# Patient Record
Sex: MALE | Race: WHITE
[De-identification: names, ages, dates, MRNs, and addresses within clinical notes are randomized per-mention and may not be internally consistent; named-entity substitution may affect disease eponyms.]

---

## 2020-10-08 ENCOUNTER — HOSPITAL ENCOUNTER (EMERGENCY)
Dept: HOSPITAL 54 - ER | Age: 30
Discharge: HOME | End: 2020-10-08
Payer: COMMERCIAL

## 2020-10-08 VITALS — SYSTOLIC BLOOD PRESSURE: 134 MMHG | DIASTOLIC BLOOD PRESSURE: 85 MMHG

## 2020-10-08 VITALS — WEIGHT: 199 LBS | BODY MASS INDEX: 29.47 KG/M2 | HEIGHT: 69 IN
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
## 2020-10-08 DIAGNOSIS — Z20.828: ICD-10-CM

## 2020-10-08 DIAGNOSIS — S52.572A: Primary | ICD-10-CM
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
## 2020-10-08 DIAGNOSIS — M25.462: ICD-10-CM

## 2020-10-08 DIAGNOSIS — S52.611A: ICD-10-CM

## 2020-10-08 DIAGNOSIS — S62.111A: ICD-10-CM

## 2020-10-08 DIAGNOSIS — S42.401A: ICD-10-CM

## 2020-10-08 PROCEDURE — 99152 MOD SED SAME PHYS/QHP 5/>YRS: CPT

## 2020-10-08 PROCEDURE — 87426 SARSCOV CORONAVIRUS AG IA: CPT

## 2020-10-08 PROCEDURE — 73100 X-RAY EXAM OF WRIST: CPT

## 2020-10-08 PROCEDURE — 73070 X-RAY EXAM OF ELBOW: CPT

## 2020-10-08 PROCEDURE — 73564 X-RAY EXAM KNEE 4 OR MORE: CPT

## 2020-10-08 PROCEDURE — G0500 MOD SEDAT ENDO SERVICE >5YRS: HCPCS

## 2020-10-08 PROCEDURE — 25605 CLTX DST RDL FX/EPHYS SEP W/: CPT

## 2020-10-08 PROCEDURE — 73110 X-RAY EXAM OF WRIST: CPT

## 2020-10-08 PROCEDURE — 96374 THER/PROPH/DIAG INJ IV PUSH: CPT

## 2020-10-08 PROCEDURE — 99285 EMERGENCY DEPT VISIT HI MDM: CPT

## 2020-10-08 PROCEDURE — 24600 TX CLSD ELBOW DISLC W/O ANES: CPT
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
## 2020-10-08 NOTE — NUR
PATIENT AWAKE, ALERT AND ORIENTED X4, BREATHING EVEN AND UNLABORED, NO SOB 
NOTED. NEEDS ATTENDED. IV removed. Catheter intact and site benign. Pressure 
and 4x4 applied to site. No bleeding noted. Patient discharged to home in 
stable condition. Written and verbal after care instructions given. Patient 
verbalizes understanding of instruction.

## 2020-10-12 ENCOUNTER — HOSPITAL ENCOUNTER (INPATIENT)
Dept: HOSPITAL 54 - ER | Age: 30
LOS: 14 days | Discharge: HOME HEALTH | DRG: 501 | End: 2020-10-26
Attending: NURSE PRACTITIONER | Admitting: NURSE PRACTITIONER
Payer: COMMERCIAL

## 2020-10-12 VITALS — DIASTOLIC BLOOD PRESSURE: 82 MMHG | SYSTOLIC BLOOD PRESSURE: 141 MMHG

## 2020-10-12 VITALS — WEIGHT: 183 LBS | BODY MASS INDEX: 27.11 KG/M2 | HEIGHT: 69 IN

## 2020-10-12 VITALS — DIASTOLIC BLOOD PRESSURE: 80 MMHG | SYSTOLIC BLOOD PRESSURE: 150 MMHG

## 2020-10-12 DIAGNOSIS — S62.102A: ICD-10-CM
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
## 2020-10-12 DIAGNOSIS — S80.02XA: ICD-10-CM
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
## 2020-10-12 DIAGNOSIS — S62.111A: ICD-10-CM

## 2020-10-12 DIAGNOSIS — S52.572A: ICD-10-CM

## 2020-10-12 DIAGNOSIS — S52.121A: Primary | ICD-10-CM

## 2020-10-12 DIAGNOSIS — S52.041A: ICD-10-CM
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
## 2020-10-12 DIAGNOSIS — T79.A11A: ICD-10-CM

## 2020-10-12 LAB
BASOPHILS # BLD AUTO: 0 /CMM (ref 0–0.2)
BASOPHILS NFR BLD AUTO: 0.4 % (ref 0–2)
BUN SERPL-MCNC: 12 MG/DL (ref 7–18)
CALCIUM SERPL-MCNC: 8.7 MG/DL (ref 8.5–10.1)
CHLORIDE SERPL-SCNC: 99 MMOL/L (ref 98–107)
CO2 SERPL-SCNC: 30 MMOL/L (ref 21–32)
CREAT SERPL-MCNC: 0.9 MG/DL (ref 0.6–1.3)
EOSINOPHIL NFR BLD AUTO: 2.1 % (ref 0–6)
GLUCOSE SERPL-MCNC: 120 MG/DL (ref 74–106)
HCT VFR BLD AUTO: 39 % (ref 39–51)
HGB BLD-MCNC: 13.2 G/DL (ref 13.5–17.5)
LYMPHOCYTES NFR BLD AUTO: 1.3 /CMM (ref 0.8–4.8)
LYMPHOCYTES NFR BLD AUTO: 18.9 % (ref 20–44)
MCHC RBC AUTO-ENTMCNC: 34 G/DL (ref 31–36)
MCV RBC AUTO: 86 FL (ref 80–96)
MONOCYTES NFR BLD AUTO: 0.6 /CMM (ref 0.1–1.3)
MONOCYTES NFR BLD AUTO: 9.2 % (ref 2–12)
NEUTROPHILS # BLD AUTO: 4.7 /CMM (ref 1.8–8.9)
NEUTROPHILS NFR BLD AUTO: 69.4 % (ref 43–81)
PLATELET # BLD AUTO: 315 /CMM (ref 150–450)
POTASSIUM SERPL-SCNC: 4.2 MMOL/L (ref 3.5–5.1)
RBC # BLD AUTO: 4.49 MIL/UL (ref 4.5–6)
SODIUM SERPL-SCNC: 136 MMOL/L (ref 136–145)
WBC NRBC COR # BLD AUTO: 6.8 K/UL (ref 4.3–11)

## 2020-10-12 PROCEDURE — A6407 PACKING STRIPS, NON-IMPREG: HCPCS

## 2020-10-12 PROCEDURE — A6253 ABSORPT DRG > 48 SQ IN W/O B: HCPCS

## 2020-10-12 PROCEDURE — A6402 STERILE GAUZE <= 16 SQ IN: HCPCS

## 2020-10-12 PROCEDURE — G0378 HOSPITAL OBSERVATION PER HR: HCPCS

## 2020-10-12 PROCEDURE — A4217 STERILE WATER/SALINE, 500 ML: HCPCS

## 2020-10-12 PROCEDURE — C1713 ANCHOR/SCREW BN/BN,TIS/BN: HCPCS

## 2020-10-12 PROCEDURE — A4565 SLINGS: HCPCS

## 2020-10-12 PROCEDURE — C9803 HOPD COVID-19 SPEC COLLECT: HCPCS

## 2020-10-12 PROCEDURE — C9113 INJ PANTOPRAZOLE SODIUM, VIA: HCPCS

## 2020-10-12 RX ADMIN — HYDROCODONE BITARTRATE AND ACETAMINOPHEN PRN TAB: 10; 325 TABLET ORAL at 23:06

## 2020-10-12 RX ADMIN — HYDROCODONE BITARTRATE AND ACETAMINOPHEN PRN TAB: 10; 325 TABLET ORAL at 18:12

## 2020-10-12 RX ADMIN — SODIUM CHLORIDE PRN MLS/HR: 9 INJECTION, SOLUTION INTRAVENOUS at 21:28

## 2020-10-12 NOTE — NUR
PT aox4. pt sent by Dr. Tiwari (ortho) for pre op, scheduled surgery tomorrow. 
no c/o pain at this time. awaiting for MD leiva

## 2020-10-12 NOTE — NUR
RN NOTES



RECEIVED PATIENT FROM E.R, ABLE TO MAKE NEEDS KNOWN. A/O X4. ROOM AIR WITH NO SIGNS OF 
DISTRESS NOTED AT THIS TIME. PATIENT ORIENTED TO ROOM, UNIT AND STAFF. IV ACCESS ON LEFT 
AC#20, SL. SAFETY MEASURES INITIATED, BED PLACED IN LOWEST LOCKED POSITION WITH SIDE RAILS 
UP X2. CALL LIGHT PLACE WITHIN EASY REACH. WILL ENDORSE TO NIGHT SHIFT NURSE FOR ADMISSION.

## 2020-10-13 VITALS — DIASTOLIC BLOOD PRESSURE: 88 MMHG | SYSTOLIC BLOOD PRESSURE: 148 MMHG

## 2020-10-13 VITALS — DIASTOLIC BLOOD PRESSURE: 75 MMHG | SYSTOLIC BLOOD PRESSURE: 149 MMHG

## 2020-10-13 VITALS — SYSTOLIC BLOOD PRESSURE: 151 MMHG | DIASTOLIC BLOOD PRESSURE: 81 MMHG

## 2020-10-13 VITALS — DIASTOLIC BLOOD PRESSURE: 79 MMHG | SYSTOLIC BLOOD PRESSURE: 142 MMHG

## 2020-10-13 VITALS — SYSTOLIC BLOOD PRESSURE: 150 MMHG | DIASTOLIC BLOOD PRESSURE: 79 MMHG

## 2020-10-13 VITALS — SYSTOLIC BLOOD PRESSURE: 153 MMHG | DIASTOLIC BLOOD PRESSURE: 81 MMHG

## 2020-10-13 LAB
BASOPHILS # BLD AUTO: 0 /CMM (ref 0–0.2)
BASOPHILS NFR BLD AUTO: 0.5 % (ref 0–2)
BUN SERPL-MCNC: 14 MG/DL (ref 7–18)
CALCIUM SERPL-MCNC: 7.9 MG/DL (ref 8.5–10.1)
CHLORIDE SERPL-SCNC: 106 MMOL/L (ref 98–107)
CO2 SERPL-SCNC: 26 MMOL/L (ref 21–32)
CREAT SERPL-MCNC: 0.8 MG/DL (ref 0.6–1.3)
EOSINOPHIL NFR BLD AUTO: 2.9 % (ref 0–6)
GLUCOSE SERPL-MCNC: 86 MG/DL (ref 74–106)
HCT VFR BLD AUTO: 33 % (ref 39–51)
HGB BLD-MCNC: 11.4 G/DL (ref 13.5–17.5)
LYMPHOCYTES NFR BLD AUTO: 1.7 /CMM (ref 0.8–4.8)
LYMPHOCYTES NFR BLD AUTO: 28.2 % (ref 20–44)
MAGNESIUM SERPL-MCNC: 2 MG/DL (ref 1.8–2.4)
MCHC RBC AUTO-ENTMCNC: 35 G/DL (ref 31–36)
MCV RBC AUTO: 86 FL (ref 80–96)
MONOCYTES NFR BLD AUTO: 0.6 /CMM (ref 0.1–1.3)
MONOCYTES NFR BLD AUTO: 9.8 % (ref 2–12)
NEUTROPHILS # BLD AUTO: 3.6 /CMM (ref 1.8–8.9)
NEUTROPHILS NFR BLD AUTO: 58.6 % (ref 43–81)
PHOSPHATE SERPL-MCNC: 3.7 MG/DL (ref 2.5–4.9)
PLATELET # BLD AUTO: 294 /CMM (ref 150–450)
POTASSIUM SERPL-SCNC: 3.6 MMOL/L (ref 3.5–5.1)
RBC # BLD AUTO: 3.82 MIL/UL (ref 4.5–6)
SODIUM SERPL-SCNC: 140 MMOL/L (ref 136–145)
WBC NRBC COR # BLD AUTO: 6.1 K/UL (ref 4.3–11)

## 2020-10-13 PROCEDURE — 0PSH04Z REPOSITION RIGHT RADIUS WITH INTERNAL FIXATION DEVICE, OPEN APPROACH: ICD-10-PCS | Performed by: SPECIALIST

## 2020-10-13 PROCEDURE — 0PSK04Z REPOSITION RIGHT ULNA WITH INTERNAL FIXATION DEVICE, OPEN APPROACH: ICD-10-PCS | Performed by: SPECIALIST

## 2020-10-13 PROCEDURE — 0KN90ZZ RELEASE RIGHT LOWER ARM AND WRIST MUSCLE, OPEN APPROACH: ICD-10-PCS | Performed by: SPECIALIST

## 2020-10-13 PROCEDURE — 01N40ZZ RELEASE ULNAR NERVE, OPEN APPROACH: ICD-10-PCS | Performed by: SPECIALIST

## 2020-10-13 PROCEDURE — 0PSJ04Z REPOSITION LEFT RADIUS WITH INTERNAL FIXATION DEVICE, OPEN APPROACH: ICD-10-PCS | Performed by: SPECIALIST

## 2020-10-13 RX ADMIN — SODIUM CHLORIDE PRN MLS/HR: 9 INJECTION, SOLUTION INTRAVENOUS at 10:08

## 2020-10-13 RX ADMIN — OXYCODONE HYDROCHLORIDE AND ACETAMINOPHEN PRN UDTAB: 5; 325 TABLET ORAL at 21:46

## 2020-10-13 RX ADMIN — DEXTROSE MONOHYDRATE SCH MLS/HR: 50 INJECTION, SOLUTION INTRAVENOUS at 21:18

## 2020-10-13 RX ADMIN — SODIUM CHLORIDE SCH MG: 9 INJECTION, SOLUTION INTRAVENOUS at 10:12

## 2020-10-13 RX ADMIN — HYDROMORPHONE HYDROCHLORIDE PRN MG: 1 INJECTION, SOLUTION INTRAMUSCULAR; INTRAVENOUS; SUBCUTANEOUS at 10:13

## 2020-10-13 RX ADMIN — DEXTROSE MONOHYDRATE SCH MLS/HR: 50 INJECTION, SOLUTION INTRAVENOUS at 22:23

## 2020-10-13 RX ADMIN — HYDROMORPHONE HYDROCHLORIDE PRN MG: 1 INJECTION, SOLUTION INTRAMUSCULAR; INTRAVENOUS; SUBCUTANEOUS at 19:23

## 2020-10-13 NOTE — NUR
MS/TELE/RN

PATIENT IS STILL SLEEPING AT THIS TIME, APPEAR COMFORTABLE, NO SIGNS OF DISTRESS NOTED, CALL 
LIGHT IN REACH, ALL NEEDS ATTENDED AT THIS TIME, WILL CONTINUE TO MONITOR.

## 2020-10-13 NOTE — NUR
RN  NOTE



THE PATIENT IS RECEIVED IN BED. THE PATIENT IS ALERT AND ORIENTED X4. DENIES PAIN. IN ROOM 
AIR AND DENIES SOB. RESPIRATION REGULAR AND UNLABORED. LAC G 18 PATENT AND NS INFUSING AT 
75ML/HR AND NO S/S INFILTRATION NOTED. BOTH ARM ON SLINGS. BOTH HANDS ARE SWOLLEN BUT THE 
PATIENT DENIES NUMBING OR TINGLING SENSATION. BED LOW AND LOCKED. SIDE RAILS UP X2. CALL 
LIGHT WITHIN REACH. WILL CONTINUE TO MONITOR.

## 2020-10-13 NOTE — NUR
RN  NOTE



THE PATIENT IS TAKEN TO OR BY THE OR TEAM. THE PATIENT LEFT THE UNIT IN STABLE CONDITION.

## 2020-10-13 NOTE — NUR
RN  NOTE



THE PATIENT IS BACK FROM SURGERY. ALERT AND ORIENTED X4. DENIES SOB. RESPIRATION REGULAR AND 
UNLABORED. LEFT FOOT G20 IV PATENT AND NS INFUSING AT 75ML/HR AND NO S/S INFILTRATION NOTED. 
WOUND VAC ON RIGHT ARM FUNCTIONING WELL WITH 100 ML OF SEROSANGUINEOUS OUTPUT. BED LOW AND 
LOCKED. SIDE RAILS UP X3. CALL LIGHT WITHIN REACH. WILL ENDORSE TO NIGHT SHIFT

## 2020-10-13 NOTE — NUR
RN  NOTE



RECEIVED A CALL [facility] STATING THAT NOW THE PATIENT A WOUND VAC AND THE WOUND VAC NOT 
TO BE REMOVED OR CHANGED BY NURSES.

## 2020-10-13 NOTE — NUR
MS/TELE/RN

RECEIVED PATIENT ON BED S/P ORIF RT. ELBOW FRACTURE AND ORIF LEFT WRIST FRACTURE DONE TODAY. 
PATIENT IS AWAKE, SLEEPY, APPEAR COMFORTABLE, WITH TOLERABLE PAIN LEVEL AT THIS TIME, JUST 
RECEIVED PAIN MEDICATION GIVEN BY DAY SHIFT RN, NO DISTRESS NOTED, BILATERAL UPPER 
EXTREMITIES SLING, WOUND VAC TO RT. UPPER EXTREMITY WITH SANGUINEOUS OUTPUT. WILL MONITOR 
PER POST OF PROTOCOL. Pt cancelled appt scheduled for tomorrow.  Pt requesting an appt sooner than first available preferably next month (1st or 2nd week) Please call [phone]

## 2020-10-13 NOTE — NUR
MS/TELE/RN

PATIENT IS SLEEPING AT THIS TIME, APPEAR COMFORTABLE, NO SIGNS OF DISTRESS NOTED, CALL LIGHT 
IN REACH, WILL CONTINUE TO MONITOR.

## 2020-10-14 VITALS — DIASTOLIC BLOOD PRESSURE: 88 MMHG | SYSTOLIC BLOOD PRESSURE: 146 MMHG

## 2020-10-14 VITALS — SYSTOLIC BLOOD PRESSURE: 146 MMHG | DIASTOLIC BLOOD PRESSURE: 77 MMHG

## 2020-10-14 VITALS — SYSTOLIC BLOOD PRESSURE: 155 MMHG | DIASTOLIC BLOOD PRESSURE: 89 MMHG

## 2020-10-14 VITALS — SYSTOLIC BLOOD PRESSURE: 161 MMHG | DIASTOLIC BLOOD PRESSURE: 74 MMHG

## 2020-10-14 VITALS — DIASTOLIC BLOOD PRESSURE: 86 MMHG | SYSTOLIC BLOOD PRESSURE: 146 MMHG

## 2020-10-14 LAB
ALBUMIN SERPL BCP-MCNC: 2.9 G/DL (ref 3.4–5)
ALP SERPL-CCNC: 47 U/L (ref 46–116)
ALT SERPL W P-5'-P-CCNC: 29 U/L (ref 12–78)
AST SERPL W P-5'-P-CCNC: 37 U/L (ref 15–37)
BASOPHILS # BLD AUTO: 0 /CMM (ref 0–0.2)
BASOPHILS NFR BLD AUTO: 0.2 % (ref 0–2)
BILIRUB SERPL-MCNC: 1 MG/DL (ref 0.2–1)
BUN SERPL-MCNC: 13 MG/DL (ref 7–18)
CALCIUM SERPL-MCNC: 8.2 MG/DL (ref 8.5–10.1)
CHLORIDE SERPL-SCNC: 101 MMOL/L (ref 98–107)
CO2 SERPL-SCNC: 27 MMOL/L (ref 21–32)
CREAT SERPL-MCNC: 0.9 MG/DL (ref 0.6–1.3)
EOSINOPHIL NFR BLD AUTO: 0.5 % (ref 0–6)
GLUCOSE SERPL-MCNC: 110 MG/DL (ref 74–106)
HCT VFR BLD AUTO: 30 % (ref 39–51)
HGB BLD-MCNC: 10.5 G/DL (ref 13.5–17.5)
LYMPHOCYTES NFR BLD AUTO: 1.3 /CMM (ref 0.8–4.8)
LYMPHOCYTES NFR BLD AUTO: 14.3 % (ref 20–44)
MCHC RBC AUTO-ENTMCNC: 35 G/DL (ref 31–36)
MCV RBC AUTO: 86 FL (ref 80–96)
MONOCYTES NFR BLD AUTO: 1.4 /CMM (ref 0.1–1.3)
MONOCYTES NFR BLD AUTO: 14.6 % (ref 2–12)
NEUTROPHILS # BLD AUTO: 6.5 /CMM (ref 1.8–8.9)
NEUTROPHILS NFR BLD AUTO: 70.4 % (ref 43–81)
PLATELET # BLD AUTO: 331 /CMM (ref 150–450)
POTASSIUM SERPL-SCNC: 3.9 MMOL/L (ref 3.5–5.1)
PROT SERPL-MCNC: 6.3 G/DL (ref 6.4–8.2)
RBC # BLD AUTO: 3.51 MIL/UL (ref 4.5–6)
SODIUM SERPL-SCNC: 137 MMOL/L (ref 136–145)
WBC NRBC COR # BLD AUTO: 9.3 K/UL (ref 4.3–11)

## 2020-10-14 RX ADMIN — Medication PRN MG: at 02:14

## 2020-10-14 RX ADMIN — DEXTROSE MONOHYDRATE SCH MLS/HR: 50 INJECTION, SOLUTION INTRAVENOUS at 06:38

## 2020-10-14 RX ADMIN — Medication PRN MG: at 12:31

## 2020-10-14 RX ADMIN — SODIUM CHLORIDE PRN MLS/HR: 9 INJECTION, SOLUTION INTRAVENOUS at 20:19

## 2020-10-14 RX ADMIN — SODIUM CHLORIDE SCH MG: 9 INJECTION, SOLUTION INTRAVENOUS at 08:57

## 2020-10-14 RX ADMIN — OXYCODONE HYDROCHLORIDE AND ACETAMINOPHEN PRN UDTAB: 5; 325 TABLET ORAL at 12:22

## 2020-10-14 RX ADMIN — DEXTROSE MONOHYDRATE SCH MLS/HR: 50 INJECTION, SOLUTION INTRAVENOUS at 06:02

## 2020-10-14 RX ADMIN — HYDROCODONE BITARTRATE AND ACETAMINOPHEN PRN TAB: 10; 325 TABLET ORAL at 10:13

## 2020-10-14 RX ADMIN — ENOXAPARIN SODIUM SCH MG: 40 INJECTION SUBCUTANEOUS at 08:59

## 2020-10-14 RX ADMIN — OXYCODONE HYDROCHLORIDE AND ACETAMINOPHEN PRN UDTAB: 5; 325 TABLET ORAL at 16:32

## 2020-10-14 RX ADMIN — OXYCODONE HYDROCHLORIDE AND ACETAMINOPHEN PRN UDTAB: 5; 325 TABLET ORAL at 06:08

## 2020-10-14 RX ADMIN — SODIUM CHLORIDE PRN MLS/HR: 9 INJECTION, SOLUTION INTRAVENOUS at 06:10

## 2020-10-14 RX ADMIN — OXYCODONE HYDROCHLORIDE AND ACETAMINOPHEN PRN UDTAB: 5; 325 TABLET ORAL at 20:32

## 2020-10-14 NOTE — NUR
MS/RN NOTE



Dr. Tiwari at bedside, states patient will go back to OR later this week to remove wound vac 
and close incision, may be discharged on Friday.

## 2020-10-14 NOTE — NUR
RN OPENING NOTES 



PATIENT RECEIVED RESTING IN BED A/O X 4. STABLE ON RA WITH BREATHING EVEN AND UNLABORED, NO 
SOB NOTED. NO SIGNS OF ACUTE DISTRESS. NO COMPLAINTS OF PAIN OR DISCOMFORT AT THE MOMENT. IV 
LOCATED ON L FOOT #20 RUNNING NS @ 75 ML/HR. WOUND VAC NOTED AND IN PLACE ON R ELBOW. BOTH 
UPPER EXTREMITIES NWB. SAFETY PRECAUTIONS IN PLACE WITH BED IN LOWEST POSITION, CALL LIGHT 
WITHIN REACH, BREAKS ON, SIDE RAILS UP. WILL CONTINUE TO MONITOR THROUGHOUT THE NIGHT.

## 2020-10-14 NOTE — NUR
MS/TELE/RN

MORNING CARE DONE, PATIENT WAS ABLE TO SIT ON CHAIR AT BEDSIDE WHILE DOING  THE BED. BROUGHT 
BACK TO BED, MADE COMFORTABLE IN BED, TOLERATED WELL, BED ALARM ON. PATIENT IS AWAKE, ALERT, 
NO CHANGE IN CONDITION, ALL NEEDS ATTENDED AT THIS TIME, WILL CONTINUE TO MONITOR.

## 2020-10-14 NOTE — NUR
MS/RN NOTE



Patient reports 8/10 aching pain on BLE, requested percocet 5-325: 2 tablets q4h as needed 
for pain (8-10/10) initially. But changed his mind and would rather take morphine 4 mg q4h 
IV as needed for breakthrough pain. Returned percocet 5-325 at the return bin, witnessed by 
Immaculate RN.

## 2020-10-14 NOTE — NUR
MS/RN OPENING NOTES



Received patient resting in bed, A&O x 4. Denies any pain and discomfort at this time. 
Breathing even and non-labored on RA. No respiratory or cardiac distress noted. IV access 
noted on L foot #20, patent and intact, and flushing well. BUE slings in place, both hands 
slightly still swollen, no tingling or numbness reported. Sensation from all peripheral 
extremities intact. Bed locked to its lowest position, bed alarm on, side rails x 2 up, call 
light in hand. Will continue current medical management. 

-------------------------------------------------------------------------------

Addendum: 10/14/20 at 0738 by GAYATRI KAN RN

-------------------------------------------------------------------------------

Wound vac noted with sanguinous drainage. Will continue to monitor.

-------------------------------------------------------------------------------

Addendum: 10/14/20 at 1924 by GAYATRI KAN RN

-------------------------------------------------------------------------------

serosanguinous drainage, not sanguinous

## 2020-10-14 NOTE — NUR
MS/RN CLOSING NOTES



Patient sleeping in bed, A&O x 4. All needs met and attended to. VSS, afebrile, no SOB 
noted. Denies any pain and discomfort at this time, given percocet at 1632. Breathing even 
and non-labored on RA. No respiratory or cardiac distress noted. IV access noted on L foot 
#20, patent and intact, and flushing well. BUE slings remain in place, both hands slightly 
still swollen, no tingling or numbness reported. Right elbow remains at 90 degrees. Wound 
vac in place, 200 mL serosanguinous output today. Sensation from all peripheral extremities 
intact. Fall precautions maintained. Will endorse to night shift nurse.

## 2020-10-15 VITALS — DIASTOLIC BLOOD PRESSURE: 91 MMHG | SYSTOLIC BLOOD PRESSURE: 154 MMHG

## 2020-10-15 VITALS — SYSTOLIC BLOOD PRESSURE: 138 MMHG | DIASTOLIC BLOOD PRESSURE: 83 MMHG

## 2020-10-15 VITALS — DIASTOLIC BLOOD PRESSURE: 96 MMHG | SYSTOLIC BLOOD PRESSURE: 181 MMHG

## 2020-10-15 VITALS — DIASTOLIC BLOOD PRESSURE: 78 MMHG | SYSTOLIC BLOOD PRESSURE: 136 MMHG

## 2020-10-15 VITALS — DIASTOLIC BLOOD PRESSURE: 81 MMHG | SYSTOLIC BLOOD PRESSURE: 145 MMHG

## 2020-10-15 RX ADMIN — Medication PRN MG: at 21:21

## 2020-10-15 RX ADMIN — OXYCODONE HYDROCHLORIDE AND ACETAMINOPHEN PRN UDTAB: 5; 325 TABLET ORAL at 12:16

## 2020-10-15 RX ADMIN — OXYCODONE HYDROCHLORIDE AND ACETAMINOPHEN PRN UDTAB: 5; 325 TABLET ORAL at 18:12

## 2020-10-15 RX ADMIN — OXYCODONE HYDROCHLORIDE AND ACETAMINOPHEN PRN UDTAB: 5; 325 TABLET ORAL at 06:24

## 2020-10-15 RX ADMIN — OXYCODONE HYDROCHLORIDE AND ACETAMINOPHEN PRN UDTAB: 5; 325 TABLET ORAL at 23:54

## 2020-10-15 RX ADMIN — ENOXAPARIN SODIUM SCH MG: 40 INJECTION SUBCUTANEOUS at 08:42

## 2020-10-15 RX ADMIN — OXYCODONE HYDROCHLORIDE AND ACETAMINOPHEN PRN UDTAB: 5; 325 TABLET ORAL at 00:41

## 2020-10-15 RX ADMIN — SODIUM CHLORIDE PRN MLS/HR: 9 INJECTION, SOLUTION INTRAVENOUS at 09:39

## 2020-10-15 RX ADMIN — SODIUM CHLORIDE SCH MG: 9 INJECTION, SOLUTION INTRAVENOUS at 08:41

## 2020-10-15 NOTE — NUR
MS/RN CLOSING NOTES

PATIENT IS ON BED AWAKE, ALERT AND ORIENTED X4. PATIENT IN  NO APPARENT RESPIRATORY DISTRESS 
NOTED. PATIENT DENIES PAIN AT THIS TIME. IV ACCESS AT LEFT AC # 20 G WITH IV FLUID OF NS 1L 
AT 75 ML/HR ON AND INFUSING WELL. SEEN AND EXAMINED BY MD WITH ORDERS MADE AND CARRIED OUT. 
SAFETY PRECAUTIONS WAS IN PLACED. BED IN LOWEST POSITION AND LOCKED. SIDERAILS UP X2. CALL 
LIGHT WITHIN REACH. WILL ENDORSED TO NIGHT SHIFT FOR DIXON. WILL ENDORSED TO NIGHT SHIFT FOR 
DIXON.

## 2020-10-15 NOTE — NUR
RN CLOSING NOTES 



PATIENT RESTING IN BED A/O X 4. STABLE ON RA WITH BREATHING EVEN AND UNLABORED, NO SOB 
NOTED. NO SIGNS OF ACUTE DISTRESS. NO COMPLAINTS OF PAIN OR DISCOMFORT AT THE MOMENT. IV 
LOCATED ON L FOOT #20 RUNNING NS @ 75 ML/HR. WOUND VAC NOTED AND IN PLACE ON R ARM TOTAL OF 
350 SERASANGEOUS OUTPUT. BOTH UPPER EXTREMITIES NWB. SAFETY PRECAUTIONS IN PLACE WITH BED IN 
LOWEST POSITION, CALL LIGHT WITHIN REACH, BREAKS ON, SIDE RAILS UP. PAIN MANAGEMENT 
THROUGHOUT THE NIGHT. ALL NEEDS ATTENDED TO. WILL ENDORSE TO ONCOMING SHIFT ABOUT DIXON.

## 2020-10-15 NOTE — NUR
RN NOTES

RECEIVED PT. AWAKE ON BED, AOX4, BILATERAL ARM WITH DRESSING AND SLING, WOUND VAC IN PLACE, 
NOT IN DISTRESS, DENIES PAIN AT THIS TIME,NO SOB, SIDERAILSUPX2, CONTINUE TO MONITOR

## 2020-10-15 NOTE — NUR
MS/RN NOTES

PATIENT COMPLAINED OF PAIN RATED 8/10 PERCOCET 5/325 MG 2 TABS. WILL CONTINUE TO MONITOR.

## 2020-10-15 NOTE — NUR
MS/RN OPENING NOTES

RECEIVED PATIENT IN BED SLEEPING EASILY AROUSABLE BY NAME AND LIGHT TOUCH. PATIENT IN NO 
APPARENT RESPIRATORY DISTRESS NOTED. DENIES PAIN AT THIS TIME. WILL CONTINUE TO MONITOR.

## 2020-10-16 VITALS — SYSTOLIC BLOOD PRESSURE: 152 MMHG | DIASTOLIC BLOOD PRESSURE: 80 MMHG

## 2020-10-16 VITALS — DIASTOLIC BLOOD PRESSURE: 80 MMHG | SYSTOLIC BLOOD PRESSURE: 152 MMHG

## 2020-10-16 PROCEDURE — 2W0CX6Z CHANGE PRESSURE DRESSING ON RIGHT LOWER ARM: ICD-10-PCS | Performed by: SPECIALIST

## 2020-10-16 PROCEDURE — 3E1U38Z IRRIGATION OF JOINTS USING IRRIGATING SUBSTANCE, PERCUTANEOUS APPROACH: ICD-10-PCS | Performed by: SPECIALIST

## 2020-10-16 RX ADMIN — SODIUM CHLORIDE PRN MLS/HR: 9 INJECTION, SOLUTION INTRAVENOUS at 21:15

## 2020-10-16 RX ADMIN — OXYCODONE HYDROCHLORIDE AND ACETAMINOPHEN PRN UDTAB: 5; 325 TABLET ORAL at 22:03

## 2020-10-16 RX ADMIN — SODIUM CHLORIDE PRN MLS/HR: 9 INJECTION, SOLUTION INTRAVENOUS at 02:40

## 2020-10-16 RX ADMIN — Medication PRN MG: at 06:00

## 2020-10-16 RX ADMIN — PANTOPRAZOLE SODIUM SCH MG: 40 TABLET, DELAYED RELEASE ORAL at 09:00

## 2020-10-16 RX ADMIN — DEXTROSE MONOHYDRATE SCH MLS/HR: 50 INJECTION, SOLUTION INTRAVENOUS at 21:06

## 2020-10-16 RX ADMIN — ENOXAPARIN SODIUM SCH MG: 40 INJECTION SUBCUTANEOUS at 09:00

## 2020-10-16 NOTE — NUR
WOUND CARE: PT HAS [employer] VAC TO RT ARM WITH NEARLY FULL CANISTER 475cc SEROSANGUINOUS 
DRAINAGE. CANISTER CHANGED. RN NOTIFIED AND SURGEON TO BE NOTIFIED BY WOUND RN. WILL SEE 
PRN.

## 2020-10-16 NOTE — NUR
WOUND CARE RN   WOUND CARE RN SPOKE TO JOVANNY IN SURGERY, SHE WILL LET DR FOFANA KNOW THAT 
PATIENT'S VAC CANNISTER WAS CHANGED THIS AM, THE SAME CANNISTER THAT WAS PLACED IN OR 
10/14/20 AND THE DRNG AMOUNT WAS 475ML'S AS OF THIS AM.

## 2020-10-16 NOTE — NUR
MS/RN CLOSING NOTES

PATIENT IS ON BED. ALERT AND ORIENTED X4. PATIENT DENIES PAIN AT THIS TIME. IV ACCESS AT 
LEFT FOOT # 20 WITH IV FLUID OF NS 1L AT 75 ML/HR ON AND INFUSING WELL. SEEN AND EXAMINED BY 
MD WITH ORDERS MADE AND CARRIED OUT. SAFETY PRECAUTION WAS IN PLACED. BED IN LOWEST POSITION 
AND LOCKED. SIDERAILS UP X2. CALL LIGHT WITHIN REACH. PATIENT IS POSSIBLE DISCHARGED TODAY. 
WILL ENDORSED TO NIGHT SHIFT FOR DIXON.

## 2020-10-16 NOTE — NUR
MS RN



RECEIVE PT IN BED, AWAKE, STABLE A/O X 4 NOT IN DISTRESS, WOUND VAC ONGOING, CONT NO WT 
BEARING RUE/LUE, ELEVATE SLING AT ALL TIMES. DENIES PAIN AT THIS TIME. EDUCATED PT. SAFETY 
MEASURES AT ALL TIMES. WILL CONT TO MONITOR

## 2020-10-16 NOTE — NUR
MS/RN OPENING NOTES

PATIENT IS ON BED AWAKE ALERT AND ORIENTED X4. PATIENT DENIES PAIN AT THIS TIME. PATIENT IN 
NO APPARENT RESPIRATORY DISTRESS NOTED. WILL CONTINUE TO MONITOR.

## 2020-10-16 NOTE — NUR
RN NOTES

SLEEPING BUT AROUSABLE, NOT IN DISTRESS, DENIES PAIN AT THIS TIME, NO SOB,CALL LIGHT WITHIN 
REACH, SIDERIALSUPX2, PT.NEEDS ATTENDED

## 2020-10-16 NOTE — NUR
MS/RN NOTES

PATIENT IS OUT IN THE UNIT.  BY OR NURSE. PATIENT IS IN STABLE CONDITION. PATIENT IN 
NO APPARENT RESPIRATORY DISTRESS NOTED. DENIES PAIN.

## 2020-10-16 NOTE — NUR
MS/RN NOTES

PATIENT CAME BACK FROM PACU, RECEIVED REPORT FROM QUEENIE CAZARES. RESUME PREOP ORDER DIET, ANCEF 
1 GM IVPB EVERY 8 HOURS X 3 DOSES, NO WEIGHT BEARING RUE/LUE, ELEVATE, LEAVE IN SLING AT ALL 
TIMES, WOUND VAC CHANGE MONDAY OCT. 19, 2020 BY WOUND CARE. NOTED AND CARRIED OUT.

## 2020-10-17 VITALS — SYSTOLIC BLOOD PRESSURE: 126 MMHG | DIASTOLIC BLOOD PRESSURE: 90 MMHG

## 2020-10-17 VITALS — SYSTOLIC BLOOD PRESSURE: 150 MMHG | DIASTOLIC BLOOD PRESSURE: 80 MMHG

## 2020-10-17 VITALS — SYSTOLIC BLOOD PRESSURE: 137 MMHG | DIASTOLIC BLOOD PRESSURE: 89 MMHG

## 2020-10-17 RX ADMIN — ENOXAPARIN SODIUM SCH MG: 40 INJECTION SUBCUTANEOUS at 08:10

## 2020-10-17 RX ADMIN — DEXTROSE MONOHYDRATE SCH MLS/HR: 50 INJECTION, SOLUTION INTRAVENOUS at 13:10

## 2020-10-17 RX ADMIN — PANTOPRAZOLE SODIUM SCH MG: 40 TABLET, DELAYED RELEASE ORAL at 08:09

## 2020-10-17 RX ADMIN — OXYCODONE HYDROCHLORIDE AND ACETAMINOPHEN PRN UDTAB: 5; 325 TABLET ORAL at 15:47

## 2020-10-17 RX ADMIN — DEXTROSE MONOHYDRATE SCH MLS/HR: 50 INJECTION, SOLUTION INTRAVENOUS at 05:18

## 2020-10-17 RX ADMIN — OXYCODONE HYDROCHLORIDE AND ACETAMINOPHEN PRN UDTAB: 5; 325 TABLET ORAL at 08:09

## 2020-10-17 RX ADMIN — OXYCODONE HYDROCHLORIDE AND ACETAMINOPHEN PRN UDTAB: 5; 325 TABLET ORAL at 22:20

## 2020-10-17 NOTE — NUR
RN NOTES



WOUND VAC CANISTER NOTED FULL WITH SANGUINOUS DRAINAGE. CANISTER CHANGED AND CONTINUES ON 
WOUND VAC AT PRESCRIBED SETTINGS. WILL CONTINUE TO MONITOR.

## 2020-10-17 NOTE — NUR
MS RN CLOSING NOTES



PT SITTING ON CHAIR BY BEDSIDE AT THIS TIME. A/O X 4. ABLE TO MAKE NEEDS KNOWN. ON ROOM AIR, 
BREATHING EVEN AND UNLABORED, NO SOB NOTED THROUGHOUT THE DAY. IV ACCESS ON LEFT FOOT G #20 
INTACT AND PATENT, IVF OF NS @ 75ML/HR INFUSING WELL, NO S/S OF INFILTRATION AT SITE NOTED. 
DRESSING ON BOTH ARMS C/D/I. SLINGS IN PLACE TO BOTH ARMS. WOUND VAC ON RIGHT ARM IN 
PROGRESS AND NOTED WITH SANGUINOUS DRAINAGE, OUTPUT RECORDED. ALL NEEDS AND CARE ATTENDED 
WELL. SAFETY MEASURES IN PLACE: CALL LIGHT W/IN REACH. WILL ENDORSE DIXON TO INCOMING NIGHT 
NURSE.

## 2020-10-17 NOTE — NUR
MS RN



PT MONITORED ACCORDINGLY, WOUND VAS AS ORDERED WITH 175ML OUTPUT. ALL NEEDS ATTENDED AND 
ANTICIPATED, KEPT CLEAN, DRY AND COMFORTABLE. NURSING CARE RENDERED,   CONT NWB RUE/LUE AT 
ALL TIMES,. NO S/S OF IMPAIRED CIRCULATION.  02 SAT WNL R.A. AFEBRILE, DENIES PAIN AT THIS 
TIME. SAFETY MEASURES AT ALL TIMES. WILL ENDORSE TO NEXT SHIFT.

## 2020-10-17 NOTE — NUR
RN NOTES



PT C/O ACHING AND SHARP PAIN ON HIS RIGHT AND LEFT ARM WITH SCALE OF 8/10. PRN PERCOCET 
5/325MG X2 TABS GIVEN AT 0809. WILL CONTINUE TO MONITOR AND REASSESS PT.

## 2020-10-17 NOTE — NUR
RN OPENING NOTE



RECEIVED PATIENT SEATED AT CHAIR, A0 X 4, COMPLAINTS OF TOLERABLE LEVEL OF PAIN, NO S/SX OF 
ACUTE DISTRESS AT THIS TIME. NO SOB NOTED. PATIENT'S BREATHING IS EVEN AND UNLABORED, 
SATURATING 99% ON ROOM AIR, HR IS 75. NOTED IV SITE AT LEFT FOOT G20 WITH NS AT 75 ML/HR, IV 
LINE PATENT AND FLUSHING WELL, NO S/S OF INFECTION OR INFILTRATION. RIGHT ARM WOUND VAC 
INTACT, LEFT HAND POST OP WOUND DRESSING DRY AND INTACT, ARM SLING MAINTAINED AT ALL TIMES. 
PATIENT IS AMBULATORY BUT NEEDS ASSISTANCE WITH URINATION. SAFETY MEASURES IMPLEMENTED PER 
PROTOCOL.  HEAD OF BED ELEVATED. BED IS LOCKED,  IN LOWEST POSITION AND SIDE RAILS UP. CALL 
LIGHT WITHIN REACH OF THE PATIENT. NON WEIGHT BEARING OF BUE MAINTAINED. WILL CONTINUE TO 
MONITOR AND REASSESS FOR ANY CHANGES.

## 2020-10-17 NOTE — NUR
RN NOTES



PT C/O ACHING AND SHARP PAIN ON B/L ARMS WITH SCALE OF 8/10. PRN PERCOCET 5/325MG X2 TABS 
GIVEN AT 1547. WILL CONTINUE TO MONITOR AND REASSESS PT.

## 2020-10-17 NOTE — NUR
MS RN OPENING NOTES



RECEIVED PT AWAKE IN BED IN NO ACUTE SIGNS OF DISTRESS. A/O X 4. ABLE TO MAKE NEEDS KNOWN, 
DENIES PAIN OR ANY DISCOMFORTS AT THIS TIME. IV ACCESS ON LEFT FOOT G #20 INTACT AND PATENT, 
IVF OF NS @ 75ML/HR INFUSING WELL. DRESSING ON BOTH ARMS C/D/I. SLINGS IN PLACE AND WOUND 
VAC ON RIGHT ARM ONGOING. SAFETY MEASURES IN PLACE. BED IN LOWEST LOCKED POSITION W/ SR UP 
X2. CALL LIGHT W/IN REACH. WILL CONT TO MONITOR

## 2020-10-18 VITALS — DIASTOLIC BLOOD PRESSURE: 88 MMHG | SYSTOLIC BLOOD PRESSURE: 163 MMHG

## 2020-10-18 VITALS — DIASTOLIC BLOOD PRESSURE: 81 MMHG | SYSTOLIC BLOOD PRESSURE: 160 MMHG

## 2020-10-18 VITALS — DIASTOLIC BLOOD PRESSURE: 79 MMHG | SYSTOLIC BLOOD PRESSURE: 141 MMHG

## 2020-10-18 VITALS — SYSTOLIC BLOOD PRESSURE: 164 MMHG | DIASTOLIC BLOOD PRESSURE: 99 MMHG

## 2020-10-18 VITALS — DIASTOLIC BLOOD PRESSURE: 99 MMHG | SYSTOLIC BLOOD PRESSURE: 164 MMHG

## 2020-10-18 RX ADMIN — OXYCODONE HYDROCHLORIDE AND ACETAMINOPHEN PRN UDTAB: 5; 325 TABLET ORAL at 08:27

## 2020-10-18 RX ADMIN — SODIUM CHLORIDE PRN MLS/HR: 9 INJECTION, SOLUTION INTRAVENOUS at 04:40

## 2020-10-18 RX ADMIN — ENOXAPARIN SODIUM SCH MG: 40 INJECTION SUBCUTANEOUS at 08:26

## 2020-10-18 RX ADMIN — OXYCODONE HYDROCHLORIDE AND ACETAMINOPHEN PRN UDTAB: 5; 325 TABLET ORAL at 18:10

## 2020-10-18 RX ADMIN — PANTOPRAZOLE SODIUM SCH MG: 40 TABLET, DELAYED RELEASE ORAL at 08:25

## 2020-10-18 NOTE — NUR
MS RN NOTES



PATIENT IN BED RESTING COMFORTABLY, ALERT AND ORIENTED X 4.ON ROOM AIR WITH NO SIGNS OF 
RESPIRATORY DISTRESS PRESENT WITH EVEN NON-LABORED BREATHING AND NO SOB NOTED. IV ACCESS 
INTACT AND PATENT CURRENTLY INFUSING IV FLUIDS. MET ALL OF PATIENT'S NEEDS. PATIENT PRESENTS 
WITH NO SIGNS OF PAIN OR DISCOMFORT AT THIS TIME. WOUND VAC SECURED IN PLACE AND PATENT WITH 
400 mL PHILIP COLOR OUTPUT. SAFETY PRECAUTIONS IN PLACE WITH BED LOCKED, BED IN THE LOWEST 
POSITION, BILATERAL SIDE RAILS UP, BED ALARM ON AND CALL LIGHT WITHIN EASY REACH. WILL 
ENDORSE PLAN OF CARE TO UPCOMING NURSE.

## 2020-10-18 NOTE — NUR
MS/RN OPENING NOTES 



RECEIVED PATIENT IS BED RESTING. PATIENT IS ALERT AND ORIENTED X 4. NO SIGNS OF SOB OR 
RESPIRATORY DISTRESS NOTED. PATIENT HAS IV ACCESS ON LEFT FOOT INTACT FLUSHING WELL. PATIENT 
HAS WOUND VAC IN PLACE DRAINING PHILIP CLEAR. PATIENT STATES NO PAIN AT THIS TIME. PATIENT 
HAS SLINGS IN PLACE BILATERAL ARMS. SAFETY MEASURES ARE IN PLACE BED LOCKED AND IN LOW 
POSITION, SIDE RAILS UP X 2, CALL LIGHT WITHIN REACH. WILL CONTINUE TO MONITOR.

## 2020-10-18 NOTE — NUR
MS RN NOTES



PATIENT RECEIVED IN BED, ALERT AND ORIENTED X4. ON ROOM AIR WITH NO SIGNS OF RESPIRATORY 
DISTRESS WITH NON-LABORED EVEN BREATHING, AND NO SOB NOTED. SKIN WARM AND DRY TO TOUCH, 
BILATERAL SLINGS IN PLACE. IV ACCESS INTACT AND PATENT ON LEFT FOOT, INFUSING IV FLUIDS AT 
75ml/hr. WOUND VAC IN PLACE, WITH 300mL, OUTPUT. PATIENT PRESENTS WITH NO SIGNS OF PAIN OR 
DISCOMFORT AT THIS TIME. SAFETY PRECAUTIONS IN PLACE WITH BED LOCKED, BED IN THE LOWEST 
POSITION, BILATERAL SIDE RAILS UP, BED ALARM ON, AND CALL LIGHT WITHIN EASY REACH. WILL 
CONTINUE TO MONITOR PATIENT.

## 2020-10-19 VITALS — DIASTOLIC BLOOD PRESSURE: 88 MMHG | SYSTOLIC BLOOD PRESSURE: 140 MMHG

## 2020-10-19 VITALS — SYSTOLIC BLOOD PRESSURE: 152 MMHG | DIASTOLIC BLOOD PRESSURE: 88 MMHG

## 2020-10-19 VITALS — DIASTOLIC BLOOD PRESSURE: 90 MMHG | SYSTOLIC BLOOD PRESSURE: 155 MMHG

## 2020-10-19 RX ADMIN — SODIUM CHLORIDE PRN MLS/HR: 9 INJECTION, SOLUTION INTRAVENOUS at 02:53

## 2020-10-19 RX ADMIN — ENOXAPARIN SODIUM SCH MG: 40 INJECTION SUBCUTANEOUS at 10:34

## 2020-10-19 RX ADMIN — Medication PRN MG: at 04:58

## 2020-10-19 RX ADMIN — OXYCODONE HYDROCHLORIDE AND ACETAMINOPHEN PRN UDTAB: 5; 325 TABLET ORAL at 12:02

## 2020-10-19 RX ADMIN — OXYCODONE HYDROCHLORIDE AND ACETAMINOPHEN PRN UDTAB: 5; 325 TABLET ORAL at 20:43

## 2020-10-19 RX ADMIN — PANTOPRAZOLE SODIUM SCH MG: 40 TABLET, DELAYED RELEASE ORAL at 09:00

## 2020-10-19 RX ADMIN — ENOXAPARIN SODIUM SCH MG: 40 INJECTION SUBCUTANEOUS at 09:00

## 2020-10-19 NOTE — NUR
MS RN OPENING NOTES



RECEIVED PT AWAKE, A/O X 4. ABLE TO MAKE NEEDS KNOWN, DENIES PAIN OR ANY DISCOMFORTS AT THIS 
TIME. ON ROOM AIR, BREATHING EVEN AND UNLABORED. IV ACCESS ON LEFT FOOT G #20 INTACT AND 
PATENT, IVF OF NS @ 75ML/HR INFUSING WELL, NO S/S OF INFILTRATION AT SITE NOTED.  DRESSING 
ON BOTH ARMS C/D/I. SLINGS IN PLACE TO BOTH ARMS AND WOUND VAC ON RIGHT ARM ONGOING AS 
ORDERED. SAFETY MEASURES IN PLACE: BED IN LOWEST LOCKED POSITION W/ SR UP X2. CALL LIGHT 
W/IN REACH. WILL CONTINUE TO MONITOR

## 2020-10-19 NOTE — NUR
WOUND CARE: RECEIVED ORDER TO CHANGE WOUND VAC DRESSING FROM DR FOFANA. SKIN PREP AND VAC 
DRAPE USED FOR PERIWOUND PROTECTION, OIL EMULSION USED FOR TENDON PROTECTION, THEN GRANUFOAM 
TO WOUND (ONE PIECE). VAC AT 125mmHg CONTINUOUS SETTING. DRAINAGE IN CANISTER IS 
SEROSANGUINOUS ORANGE/RED APPROX 100cc. ARM KEPT IN SPLINT AND WRAPPED GENTLY WITH ACE.  PT 
TOLERATED WELL. 

-------------------------------------------------------------------------------

Addendum: 10/19/20 at 1257 by JEFFREY MOHAN WNDNU

-------------------------------------------------------------------------------

WOUND MEASUREMENT RT ARM WOUND 18CM X 10CM X 0.1CM.

## 2020-10-19 NOTE — NUR
WOUND CARE: PT SEEN FOR WOUND VAC DRESSING CHANGE PER DR FOFANA ORDER BUT PT REFUSED. PT 
STATED THAT HE WAS GOING TO SURGERY TODAY FOR WOUND CLOSURE (ALTHOUGH THERE IS NO ORDER TO 
THAT EFFECT). DR FOFANA CALLED BY WOUND CARE RN AND PT'S RN AND MSG LEFT. CHARGE RN AWARE. 
AWAITING CALL FROM DR FOFANA. VAC FUNCTIONING WELL AT 125mmHg CONTINUOUS SETTING. THERE IS 
80cc IN CANISTER AT THIS TIME (SEROSANGUINOUS DRAINAGE) AND FULL CANISTER WAS CHANGED AT THE 
CHANGE OF SHIFT THIS AM PER RN (500cc).

## 2020-10-19 NOTE — NUR
RN NOTES



ASSISTED WOUND NURSE JEFFREY TO CHANGE WOUND VAC DRESSING ON RIGHT ARM. PHOTO TAKEN AND FILED 
ON HIS CHART. WOUND VAC CONTINUES ON 125MMHG WITH SANGUINOUS DRAINAGE NOTED. WILL CONTINUE 
TO MONITOR.

## 2020-10-19 NOTE — NUR
RN NOTES



ALEX LEDEZMA CAME AND SPOKE TO PT THAT DR FOFANA WILL DO SURGICAL CLOSURE ON PT'S 
RIGHT ARM NOT TOMORROW BUT ON THURSDAY 10/22/20, NO SCHEDULE TIME YET. NPO WILL BE ENFORCED 
POST MIDNIGHT ON WEDNESDAY. WILL ENDORSE TO INCOMING NURSE.

## 2020-10-19 NOTE — NUR
RN MS notes

Pt is complaining of pain and requesting pain meds percocet. Administered percocet 5/325/2 
tabs/po as ordered for pain per pt's request. VS is stable. Safety precautions is 
maintained. Will continue to monitor.

## 2020-10-19 NOTE — NUR
RN MS opening notes

Received Pt from morning nurse. Pt is sitting in bed comfortably watching TV. Pt is alert 
and orientedX4. Respiration is normal. No SOB. No S/S of distress noted. IV sites at L foot# 
20 is clean, intact and infusing well NS@ 75 ml/hr. Per am nurse, Wound care nurse already 
changed the wound vac today.  R arm wound vac at 125 mm/hg presssure is intact and draining 
sanguinese. L hand dressing  is intact, and clean. Both slings are in placed. Safety 
precautions is maintained. Bed at low position, brakes locked, side railsupX2, urinal at the 
bed side and call light is within reach. Will continue to monitor.

## 2020-10-19 NOTE — NUR
RN NOTES



PT REFUSED TO EAT BREAKFAST AND TO TAKE HIS AM MEDS, SAME REFUSED TO HAVE HIS WOUND VAC 
DRESSING TO BE CHANGED BY WOUND NURSE JEFFREY THIS MORNING. HE VERBALIZED THAT DR FOFANA 
PROMISED HIM LAST FRIDAY (10/16/20) THAT HE WILL SUTURE/CLOSE HIS WOUND ON HIS LEFT ARM 
TODAY BUT THERE'S NO ORDER SEEN ON THE COMPUTER. I AND WOUND NURSE JEFFREY CALLED DR FOFANA 
OFFICE ( EXCHANGE), LEFT MESSAGE TO  AND SHE SAID THAT SHE WILL RELAY MESSAGE TO DR FOFANA. AWAITING FOR DR FOFANA RETURN CALL.

## 2020-10-19 NOTE — NUR
MS RN CLOSING NOTES



PT AWAKE AND RESTING IN BED. A/O X 4. ABLE TO MAKE NEEDS KNOWN. ON ROOM AIR, BREATHING EVEN 
AND UNLABORED. IV ACCESS ON LEFT FOOT G #20 INTACT AND PATENT, IVF OF NS @ 75ML/HR INFUSING 
WELL, NO S/S OF INFILTRATION AT SITE NOTED.  DRESSING ON BOTH ARMS C/D/I. SLINGS IN PLACE TO 
BOTH ARMS AND WOUND VAC ON RIGHT ARM CONTINUOS  MM/HG PRESSURE WITH 100ML OF 
SANGUINOUS DRAINAGE NOTED THIS SHIFT. ALL NEEDS AND CARE ATTENDED WELL. SAFETY MEASURES KEPT 
IN PLACE: BED IN LOWEST LOCKED POSITION W/ SR UP X2. CALL LIGHT W/IN REACH. WILL ENDORSE TO 
NIGHT SHIFT NURSE FOR DIXON.

## 2020-10-19 NOTE — NUR
MS/RN CLOSING NOTES 



PATIENT IS BED RESTING. PATIENT IS ALERT AND ORIENTED X 4. NO SIGNS OF SOB OR RESPIRATORY 
DISTRESS NOTED. PATIENT HAS IV ACCESS ON LEFT FOOT INTACT FLUSHING WELL RUNNING NS AT 
75CC/HR. PATIENT HAS WOUND VAC IN PLACE DRAINING PHILIP CLEAR, CHANGED DURING SHIFT 500CC, 
CURRENT OUTPUT 50CC. PATIENT STATES NO PAIN AT THIS TIME. PATIENT HAS SLINGS IN PLACE 
BILATERAL ARMS. SAFETY MEASURES ARE IN PLACE BED LOCKED AND IN LOW POSITION, SIDE RAILS UP X 
2, CALL LIGHT WITHIN REACH. WILL ENDORSE CARE TO DAY SHIFT.

## 2020-10-20 VITALS — SYSTOLIC BLOOD PRESSURE: 166 MMHG | DIASTOLIC BLOOD PRESSURE: 91 MMHG

## 2020-10-20 VITALS — SYSTOLIC BLOOD PRESSURE: 151 MMHG | DIASTOLIC BLOOD PRESSURE: 90 MMHG

## 2020-10-20 VITALS — SYSTOLIC BLOOD PRESSURE: 143 MMHG | DIASTOLIC BLOOD PRESSURE: 83 MMHG

## 2020-10-20 RX ADMIN — OXYCODONE HYDROCHLORIDE AND ACETAMINOPHEN PRN UDTAB: 5; 325 TABLET ORAL at 21:09

## 2020-10-20 RX ADMIN — SODIUM CHLORIDE PRN MLS/HR: 9 INJECTION, SOLUTION INTRAVENOUS at 02:43

## 2020-10-20 RX ADMIN — OXYCODONE HYDROCHLORIDE AND ACETAMINOPHEN PRN UDTAB: 5; 325 TABLET ORAL at 02:50

## 2020-10-20 RX ADMIN — OXYCODONE HYDROCHLORIDE AND ACETAMINOPHEN PRN UDTAB: 5; 325 TABLET ORAL at 12:29

## 2020-10-20 RX ADMIN — PANTOPRAZOLE SODIUM SCH MG: 40 TABLET, DELAYED RELEASE ORAL at 08:55

## 2020-10-20 RX ADMIN — ENOXAPARIN SODIUM SCH MG: 40 INJECTION SUBCUTANEOUS at 08:56

## 2020-10-20 RX ADMIN — SODIUM CHLORIDE PRN MLS/HR: 9 INJECTION, SOLUTION INTRAVENOUS at 19:45

## 2020-10-20 NOTE — NUR
WOUND CARE CONSULT: KCI VAC TO RT ARM FUNCTIONING WELL AT 125mmHg CONTINUOUS SETTING. 
SEROSANGUINOUS DRAINAGE IN CANISTER, APPROX 200cc AT THIS TIME. WILL FOLLOW.

## 2020-10-20 NOTE — NUR
MS RN NOTE 



ADMINISTERED PRN PERCOCET 10MG/650MG FOR PAIN IN BOTH ARMS. WILL CONTINUE TO MONITOR.

## 2020-10-20 NOTE — NUR
RN MS closing notes

Pt is resting in bed comfortably. Pt is alert and orientedX4. Respiration is normal. No SOB. 
No S/S of distress noted. IV sites at L foot# 20 is clean, intact and infusing well NS@ 75 
ml/hr. Vs is stable. Afebrile. R arm wound vac at 125 mm/hg pressure is intact and draining 
sanguinese with output 100 ml. L hand dressing  is intact, and clean. R arm dressing is 
clean, and  intact. Both slings are in placed. Kept Pt clean, dry and comfortable. All needs 
met and attended. Safety precautions is maintained. Bed at low position, brakes locked, side 
railsupX2, urinal at the bed side and call light is within reach. Will endorse to morning 
nurse for DIXON.

## 2020-10-20 NOTE — NUR
MS RN OPENING NOTE 



RECEIVED PATIENT IN BED. A/OX4. TOLERATING ROOM AIR. RESPIRATIONS ARE EVEN AND UNLABORED. NO 
S/S SOB NOTED. NO C/O PAIN AT THIS TIME. IN NO APPARENT DISTRESS.IV ACCESS IN LEFT FOOT 
RUNNING NS@75ML/HR. RIGHT ARM WOUND VAC STILL DRAINING. BED IS LOW AND LOCKED, HOB ELEVATED 
IN SEMI FOWLERS, SIDE RIALS UP X2. DEJUAN LIGHT WITHIN REACH. ALL PERSONAL ITEMS WITHIN REACH. 
WILL CONTINUE TO MONITOR.

## 2020-10-20 NOTE — NUR
MS RN OPENING SHIFT NOTES



RECEIVED PT RESTING IN BED, A/O X4. ON RA WITH O2 SAT @ 98%. NO SOB NOTED, BREATHING EVEN 
AND UNLABORED. NO ACUTE RESPIRATORY DISTRESS NOTES. IV TO LT FOOT #20G RUNNING NS @75ML/HR 
PATENT AND INTACT NO REDNESS OR SWELLING NOTED, NO SIGNS OF INFILTRATION NOTED. RT ARM WITH 
WOUND VAC IN PLACE  MM/HG PRESSURE INTACT AND DRAINING DRAINGNING SANGUINEOUS FLUID. 
DRESSING TO LT HAND INTACT AND CLEAN. BILAT SLING IN PLACE. BED AT LOWEST POSITION WITH SIDE 
RAILS UP X 2 AND LOCKED. CALL LIGHT WITHIN REACH. SAFETY PRECAUTIONS IN PLACE, WILL CONTINUE 
TO MONITOR THROUGHOUT SHIFT. . 



.

## 2020-10-20 NOTE — NUR
MS RN CLOSING SHIFT NOTES



PATIENT RESTING IN BED, A/O X4. NO SOB NOTED, BREATHING EVEN AND UNLABORED. NO ACUTE 
RESPIRATORY DISTRESS NOTED.  IV TO LT FOOT #20G RUNNING NS @75ML/HR PATENT AND INTACT NO 
REDNESS OR SWELLING NOTED, NO SIGNS OF INFILTRATION NOTED. RT ARM WITH WOUND VAC IN PLACE AT 
125 MM/HG PRESSURE INTACT AND DRAINING SANGUINEOUS FLUID. DRESSING TO LT HAND INTACT AND 
CLEAN. BILAT SLING IN PLACE. BED AT LOWEST POSITION WITH SIDE RAILS UP X 2 AND LOCKED. CALL 
LIGHT WITHIN REACH. SAFETY PRECAUTIONS IN PLACE, PT IS TO BE NPO ON WED NIGHT HAVING SURGERY 
ON THURSDAY. CONSENT SIGNED.

## 2020-10-21 VITALS — DIASTOLIC BLOOD PRESSURE: 90 MMHG | SYSTOLIC BLOOD PRESSURE: 151 MMHG

## 2020-10-21 VITALS — SYSTOLIC BLOOD PRESSURE: 137 MMHG | DIASTOLIC BLOOD PRESSURE: 76 MMHG

## 2020-10-21 VITALS — DIASTOLIC BLOOD PRESSURE: 91 MMHG | SYSTOLIC BLOOD PRESSURE: 165 MMHG

## 2020-10-21 VITALS — DIASTOLIC BLOOD PRESSURE: 85 MMHG | SYSTOLIC BLOOD PRESSURE: 134 MMHG

## 2020-10-21 LAB
BASOPHILS # BLD AUTO: 0 /CMM (ref 0–0.2)
BASOPHILS NFR BLD AUTO: 0.5 % (ref 0–2)
BUN SERPL-MCNC: 13 MG/DL (ref 7–18)
CALCIUM SERPL-MCNC: 9.1 MG/DL (ref 8.5–10.1)
CHLORIDE SERPL-SCNC: 102 MMOL/L (ref 98–107)
CO2 SERPL-SCNC: 27 MMOL/L (ref 21–32)
CREAT SERPL-MCNC: 0.8 MG/DL (ref 0.6–1.3)
EOSINOPHIL NFR BLD AUTO: 2.7 % (ref 0–6)
GLUCOSE SERPL-MCNC: 95 MG/DL (ref 74–106)
HCT VFR BLD AUTO: 32 % (ref 39–51)
HGB BLD-MCNC: 11 G/DL (ref 13.5–17.5)
LYMPHOCYTES NFR BLD AUTO: 1.9 /CMM (ref 0.8–4.8)
LYMPHOCYTES NFR BLD AUTO: 23.9 % (ref 20–44)
MCHC RBC AUTO-ENTMCNC: 35 G/DL (ref 31–36)
MCV RBC AUTO: 86 FL (ref 80–96)
MONOCYTES NFR BLD AUTO: 0.5 /CMM (ref 0.1–1.3)
MONOCYTES NFR BLD AUTO: 6.1 % (ref 2–12)
NEUTROPHILS # BLD AUTO: 5.3 /CMM (ref 1.8–8.9)
NEUTROPHILS NFR BLD AUTO: 66.8 % (ref 43–81)
PLATELET # BLD AUTO: 647 /CMM (ref 150–450)
POTASSIUM SERPL-SCNC: 4 MMOL/L (ref 3.5–5.1)
RBC # BLD AUTO: 3.72 MIL/UL (ref 4.5–6)
SODIUM SERPL-SCNC: 139 MMOL/L (ref 136–145)
WBC NRBC COR # BLD AUTO: 7.9 K/UL (ref 4.3–11)

## 2020-10-21 RX ADMIN — PANTOPRAZOLE SODIUM SCH MG: 40 TABLET, DELAYED RELEASE ORAL at 08:46

## 2020-10-21 RX ADMIN — OXYCODONE HYDROCHLORIDE AND ACETAMINOPHEN PRN UDTAB: 5; 325 TABLET ORAL at 08:48

## 2020-10-21 RX ADMIN — OXYCODONE HYDROCHLORIDE AND ACETAMINOPHEN PRN UDTAB: 5; 325 TABLET ORAL at 23:49

## 2020-10-21 RX ADMIN — ENOXAPARIN SODIUM SCH MG: 40 INJECTION SUBCUTANEOUS at 08:46

## 2020-10-21 RX ADMIN — SODIUM CHLORIDE PRN MLS/HR: 9 INJECTION, SOLUTION INTRAVENOUS at 17:44

## 2020-10-21 RX ADMIN — OXYCODONE HYDROCHLORIDE AND ACETAMINOPHEN PRN UDTAB: 5; 325 TABLET ORAL at 17:44

## 2020-10-21 NOTE — NUR
MS RN OPENING NOTES



RECEIVED PATIENT IN BED, ALERT AND ORIENTED X 4. VERBALLY RESPONSIVE AND ABLE TO FOLLOW 
DIRECTIONS. BREATHING REGULAR AND UNLABORED ON ROOM AIR. LEFT FOOT G20 IV LINE INTACT AND 
PATENT, INFUSING WELL WITH NO BLEEDING OR S/S OF INFILTRATION NOTED. DENIES SUICIDAL 
IDEATION AT THIS TIME. COMPLAINED OF RIGHT ARM PAIN, OFFERED PAIN MEDICATION BUT HE REFUSED. 
NON-PHARMACOLOGICAL INTERVENTIONS PROVIDED. BED LOW AND LOCKED ON SEMI FOWLERS POSITION. 
CALL LIGHT IN REACH. WILL CONTINUE TO MONITOR.

## 2020-10-21 NOTE — NUR
MS RN OPENING NOTE 



PATIENT IS A/O X 4 WITH NO SIGNS OF ACUTE DISTRESS IN ROOM AIR. NO SIGNS OF PAIN AT THIS 
MOMENT. IV L FOOT INTACT RUNNING AT 75 ML/HR. RIGHT ARM WOUND VAC DRAINING  TOTAL OUTPUT END 
OF SHIFT 50. NPO AT MIDNIGHT CONSENT SIGNED.  PATIENT KEPT CLEAN AND DRY. ALL NEEDS, CARE, 
TREATMENT AND MEDICATIONS ADMINISTERED AS ANTICIPATED PER ORDER. SAFETY MEASURES ARE 
APPLIED, BED IS LOW AND LOCKED POSITION. SIDE RAILS UP X 2 FOR SAFETY. CALL LIGHT WITHIN 
REACH. WILL ENDORSE TO THE NEXT NIGHT SHIFT

## 2020-10-21 NOTE — NUR
MS RN CLOSED NOTE 



PATIENT IS A/O X 4 WITH NO SIGNS OF ACUTE DISTRESS IN ROOM AIR. NO SIGNS OF PAIN AT THIS 
MOMENT. IV L FOOT INTACT RUNNING AT 75 ML/HR. RIGHT ARM WOUND VAC DRAINING  TOTAL OUTPUT END 
OF SHIFT 50. NPO AT MIDNIGHT CONSENT SIGNED.  PATIENT KEPT CLEAN AND DRY. ALL NEEDS, CARE, 
TREATMENT AND MEDICATIONS ADMINISTERED AS ANTICIPATED PER ORDER. SAFETY MEASURES ARE 
APPLIED, BED IS LOW AND LOCKED POSITION. SIDE RAILS UP X 2 FOR SAFETY. CALL LIGHT WITHIN 
REACH. WILL ENDORSE TO THE NEXT NIGHT SHIFT

## 2020-10-21 NOTE — NUR
MS RN CLOSING NOTE 



PATIENT RESTING IN BED. A/OX4. REMAINS TOLERATING ROOM AIR. NO RESP DISTRESS. MANAGED PAIN 
WITH PERCOCET. NO DISTRESS. IV ACCESS MAINTAINED IN LEFT FOOT RUNNING NS@75ML/HR. RIGHT ARM 
WOUND VAC STILL DRAINING, OUTPUT 50ML. BED REMAINS LOW AND LOCKED, HOB ELEVATED IN SEMI 
FOWLERS, SIDE RIALS UP X2. CALL LIGHT WITHIN REACH. WILL ENDORSE TO NEXT SHIFT.

## 2020-10-21 NOTE — NUR
WOUND CARE: PT HAS KCI VAC TO RT ARM, FUNCTIONING WELL AT 125mm Hg CONTINUOUS SETTING WITH 
SEROSANGUINOUS DRAINAGE IN CANISTER.

## 2020-10-21 NOTE — NUR
WOUND CARE: LEFT MSG FOR DR FOFANA REGARDING REQUEST TO WAIT TIL 10/22/20 FOR VAC DRESSING 
CHANGE DUE TO PT DISCOMFORT WITH DRESSING CHANGE DUE TO LARGE WOUND RT ARM. DISCUSSED WITH 
CHARGE RN AND NURSING STAFF. 350cc SEROSANGUINOUS DRAINAGE NOTED IN CANISTER SINCE DRESSING 
CHANGE ON 10/19/20. WILL FOLLOW.

## 2020-10-21 NOTE — NUR
MS RN OPENING NOTE 



PATIENT IS A/O X 4 WITH NO SIGNS OF ACUTE DISTRESS IN ROOM AIR. NO SIGNS OF PAIN AT THIS 
MOMENT. IV L FOOT INTACT RUNNING AT 75 ML/HR. RIGHT ARM WOUND VAC DRAINING STARTING OUTPUT 
350. SAFETY MEASURES ARE APPLIED, BED IS LOW AND LOCKED POSITION. SIDE RAILS UP X 2 FOR 
SAFETY. CALL LIGHT WITHIN REACH. WILL CONTINUE TO MONITOR.

## 2020-10-22 VITALS — DIASTOLIC BLOOD PRESSURE: 85 MMHG | SYSTOLIC BLOOD PRESSURE: 142 MMHG

## 2020-10-22 VITALS — SYSTOLIC BLOOD PRESSURE: 140 MMHG | DIASTOLIC BLOOD PRESSURE: 69 MMHG

## 2020-10-22 VITALS — SYSTOLIC BLOOD PRESSURE: 133 MMHG | DIASTOLIC BLOOD PRESSURE: 79 MMHG

## 2020-10-22 VITALS — SYSTOLIC BLOOD PRESSURE: 141 MMHG | DIASTOLIC BLOOD PRESSURE: 86 MMHG

## 2020-10-22 LAB
BASOPHILS # BLD AUTO: 0 /CMM (ref 0–0.2)
BASOPHILS NFR BLD AUTO: 0.4 % (ref 0–2)
BUN SERPL-MCNC: 16 MG/DL (ref 7–18)
CALCIUM SERPL-MCNC: 9.1 MG/DL (ref 8.5–10.1)
CHLORIDE SERPL-SCNC: 103 MMOL/L (ref 98–107)
CO2 SERPL-SCNC: 27 MMOL/L (ref 21–32)
CREAT SERPL-MCNC: 0.8 MG/DL (ref 0.6–1.3)
EOSINOPHIL NFR BLD AUTO: 2.3 % (ref 0–6)
GLUCOSE SERPL-MCNC: 93 MG/DL (ref 74–106)
HCT VFR BLD AUTO: 31 % (ref 39–51)
HGB BLD-MCNC: 10.8 G/DL (ref 13.5–17.5)
LYMPHOCYTES NFR BLD AUTO: 2.7 /CMM (ref 0.8–4.8)
LYMPHOCYTES NFR BLD AUTO: 32.9 % (ref 20–44)
MCHC RBC AUTO-ENTMCNC: 35 G/DL (ref 31–36)
MCV RBC AUTO: 85 FL (ref 80–96)
MONOCYTES NFR BLD AUTO: 0.5 /CMM (ref 0.1–1.3)
MONOCYTES NFR BLD AUTO: 6.6 % (ref 2–12)
NEUTROPHILS # BLD AUTO: 4.7 /CMM (ref 1.8–8.9)
NEUTROPHILS NFR BLD AUTO: 57.8 % (ref 43–81)
PLATELET # BLD AUTO: 683 /CMM (ref 150–450)
POTASSIUM SERPL-SCNC: 4.3 MMOL/L (ref 3.5–5.1)
RBC # BLD AUTO: 3.65 MIL/UL (ref 4.5–6)
SODIUM SERPL-SCNC: 138 MMOL/L (ref 136–145)
WBC NRBC COR # BLD AUTO: 8.1 K/UL (ref 4.3–11)

## 2020-10-22 PROCEDURE — 0HQDXZZ REPAIR RIGHT LOWER ARM SKIN, EXTERNAL APPROACH: ICD-10-PCS | Performed by: SPECIALIST

## 2020-10-22 PROCEDURE — 2W0CX6Z CHANGE PRESSURE DRESSING ON RIGHT LOWER ARM: ICD-10-PCS | Performed by: SPECIALIST

## 2020-10-22 RX ADMIN — PANTOPRAZOLE SODIUM SCH MG: 40 TABLET, DELAYED RELEASE ORAL at 09:00

## 2020-10-22 RX ADMIN — ENOXAPARIN SODIUM SCH MG: 40 INJECTION SUBCUTANEOUS at 09:00

## 2020-10-22 RX ADMIN — SODIUM CHLORIDE PRN MLS/HR: 9 INJECTION, SOLUTION INTRAVENOUS at 05:57

## 2020-10-22 RX ADMIN — OXYCODONE HYDROCHLORIDE AND ACETAMINOPHEN PRN UDTAB: 5; 325 TABLET ORAL at 18:11

## 2020-10-22 RX ADMIN — SODIUM CHLORIDE PRN MLS/HR: 9 INJECTION, SOLUTION INTRAVENOUS at 22:44

## 2020-10-22 NOTE — NUR
MS/TELE/RN

RECEIVED PATIENT ON BED AWAKE, ALERT, ORIENTED, COMFORTABLE, NO C/O PAIN, NO DISTRESS NOTED, 
LEFT HAND AND RIGHT ARM WITH DRESSING, RT. ARM WITH SLING, WOUND VAC TO RT. ARM, CALL RIGHT 
IN REACH, NEEDS ATTENDED, WILL MONITOR.

## 2020-10-22 NOTE — NUR
MS RN OPENING NOTES



RECEIVED PATIENT IN BED, ALERT AND ORIENTED X 4. VERBALLY RESPONSIVE AND ABLE TO FOLLOW 
DIRECTIONS. BREATHING REGULAR AND UNLABORED ON ROOM AIR. LEFT FOOT G20 IV LINE INTACT AND 
PATENT, INFUSING WELL WITH NO BLEEDING OR S/S OF INFILTRATION NOTED. DENIES PAIN AT THIS 
TIME. ON NPO FOR RFA WOUND CLOSURE VS WOUND VAC CHANGE .PT IS COMPLIANT. NON-PHARMACOLOGICAL 
INTERVENTIONS PROVIDED. BED LOW AND LOCKED ON SEMI FOWLERS POSITION. CALL LIGHT IN REACH. 
WILL CONTINUE TO MONITOR.

## 2020-10-22 NOTE — NUR
MS RN NOTES



COMPLAINED OF 9/10 RIGHT ARM PAIN, PERCOCET 5/325 GIVEN BY MOUTH. NON-PHARMACOLOGICAL 
INTERVENTIONS PROVIDED. VITAL SIGNS WNL. ADVISED ON NOTHING BY MOUTH STARTING MIDNIGHT. WILL 
CONTINUE TO MONITOR.

## 2020-10-22 NOTE — NUR
PT ARRIVED FROM O.R. S/P PARTIAL WOUND CLOSURE OF RT FOREARM DONE BY DR FOFANA .WITH HALF 
SPLINT IN PLACE.WOUND SPONGE AND TUBING CHANGED IN O.R..TODAY AND OBTAINED 90ML 
SEROSANGUINEOUS WOUND DRAINAGE AND 80ML FROM MY SHIFT WITH A TOTAL  ML WOUND VAC 
OUTPUT.PT C/O SEVERE RFA PAIN.WILL ADMINISTER PERCOCET PRN AS ORDERED. PT EATING DINNER WITH 
MINIMAL ASSIST.PT WAS ABLE TO MOVE HIS BILATERAL FINGERS. HOOKED PT BACK TO IVF INFUSION OF 
NS AT 75 ML/HR INFUSING WELL TO THE LT FOOT.V/S /69 HR 75 RR 18 T 97.8 O2 SAT 99% PA 
10/10.

## 2020-10-22 NOTE — NUR
MS RN CLOSING NOTES



PATIENT IN BED, ALERT AND ORIENTED X 4. AFEBRILE WITH NO S/S OF DISTRESS OBSERVED. LEFT FOOT 
G20 IV LINE PATENT AND INFUSING WELL. COMPLAINED OF RIGHT ARM PAIN, OFFERED PAIN MEDICATION 
BUT HE REFUSED. NON-PHARMACOLOGICAL INTERVENTIONS PROVIDED. RIGHT FOREARM WOUND VAC INTACT 
WITH CLEAR LIGHT BROWNISH COLORED OUTPUT, 180cc IN AMOUNT. BED LOW AND LOCKED ON SEMI 
FOWLERS POSITION. CALL LIGHT IN REACH. WILL ENDORSE TO MORNING SHIFT FOR [facility].

## 2020-10-23 VITALS — SYSTOLIC BLOOD PRESSURE: 146 MMHG | DIASTOLIC BLOOD PRESSURE: 81 MMHG

## 2020-10-23 VITALS — DIASTOLIC BLOOD PRESSURE: 72 MMHG | SYSTOLIC BLOOD PRESSURE: 142 MMHG

## 2020-10-23 VITALS — DIASTOLIC BLOOD PRESSURE: 93 MMHG | SYSTOLIC BLOOD PRESSURE: 147 MMHG

## 2020-10-23 LAB
BASOPHILS # BLD AUTO: 0 /CMM (ref 0–0.2)
BASOPHILS NFR BLD AUTO: 0.4 % (ref 0–2)
BUN SERPL-MCNC: 12 MG/DL (ref 7–18)
CALCIUM SERPL-MCNC: 8.7 MG/DL (ref 8.5–10.1)
CHLORIDE SERPL-SCNC: 103 MMOL/L (ref 98–107)
CO2 SERPL-SCNC: 27 MMOL/L (ref 21–32)
CREAT SERPL-MCNC: 0.8 MG/DL (ref 0.6–1.3)
EOSINOPHIL NFR BLD AUTO: 2.4 % (ref 0–6)
GLUCOSE SERPL-MCNC: 94 MG/DL (ref 74–106)
HCT VFR BLD AUTO: 30 % (ref 39–51)
HGB BLD-MCNC: 10.3 G/DL (ref 13.5–17.5)
LYMPHOCYTES NFR BLD AUTO: 2.2 /CMM (ref 0.8–4.8)
LYMPHOCYTES NFR BLD AUTO: 25 % (ref 20–44)
MCHC RBC AUTO-ENTMCNC: 35 G/DL (ref 31–36)
MCV RBC AUTO: 86 FL (ref 80–96)
MONOCYTES NFR BLD AUTO: 0.7 /CMM (ref 0.1–1.3)
MONOCYTES NFR BLD AUTO: 7.4 % (ref 2–12)
NEUTROPHILS # BLD AUTO: 5.7 /CMM (ref 1.8–8.9)
NEUTROPHILS NFR BLD AUTO: 64.8 % (ref 43–81)
PLATELET # BLD AUTO: 636 /CMM (ref 150–450)
POTASSIUM SERPL-SCNC: 3.9 MMOL/L (ref 3.5–5.1)
RBC # BLD AUTO: 3.45 MIL/UL (ref 4.5–6)
SODIUM SERPL-SCNC: 139 MMOL/L (ref 136–145)
WBC NRBC COR # BLD AUTO: 8.8 K/UL (ref 4.3–11)

## 2020-10-23 RX ADMIN — OXYCODONE HYDROCHLORIDE AND ACETAMINOPHEN PRN UDTAB: 5; 325 TABLET ORAL at 14:26

## 2020-10-23 RX ADMIN — OXYCODONE HYDROCHLORIDE AND ACETAMINOPHEN PRN UDTAB: 5; 325 TABLET ORAL at 00:59

## 2020-10-23 RX ADMIN — ENOXAPARIN SODIUM SCH MG: 40 INJECTION SUBCUTANEOUS at 08:50

## 2020-10-23 RX ADMIN — OXYCODONE HYDROCHLORIDE AND ACETAMINOPHEN PRN UDTAB: 5; 325 TABLET ORAL at 23:41

## 2020-10-23 RX ADMIN — PANTOPRAZOLE SODIUM SCH MG: 40 TABLET, DELAYED RELEASE ORAL at 08:49

## 2020-10-23 RX ADMIN — OXYCODONE HYDROCHLORIDE AND ACETAMINOPHEN PRN UDTAB: 5; 325 TABLET ORAL at 08:49

## 2020-10-23 NOTE — NUR
Closing Note: Patient alert, awake, and oriented x4. Able to make needs known. No s/s of 
distress or discomfort noted, no c/o pain at this time. No SOB noted. VS WNL. Patient 
assisted with ADLs and transfers. Kept clean and dry. Bed locked and in lowest position. 
Fall precautions observed. Reminded patient to use call light. Needs anticipated and 
attended. Call light within easy reach.

## 2020-10-23 NOTE — NUR
MS/RN NOTES 



PATIENT COMPLAINING OF PAIN ON RIGHT ARM. GIVEN PERCOCET 5/325 MG PO. V/S ARE STABLE, WILL 
CONTINUE TO MONITOR.

## 2020-10-23 NOTE — NUR
WOUND CARE: KCI VAC FUNCTIONING WELL AT 125mmHg CONTINUOUS SETTING WITH APPROX 325cc 
PINK/PHILIP DRAINAGE IN CANISTER. DRESSING TO RT ARM DRY AND INTACT.

## 2020-10-23 NOTE — NUR
07:30 Opening Notes: Received report from night nurse. Patient alert, awake and oriented x4. 
Patient able to make needs known. No s/s of distress or discomfort, no SOB. Patient assisted 
with breakfast, tolerated well. Assisted with ADLs and transfers, kept clean and dry. 
Patient reminded to use call light. Call light within reach.

## 2020-10-23 NOTE — NUR
MS/RN OPENING NOTES 



RECEIVED PATIENT IN BED RESTING. PATIENT IS ALERT AND ORIENTED X 4. NO SIGNS OF SOB OR 
RESPIRATORY DISTRESS NOTED. PATIENTS BREATHING IS EVEN AND UNLABORED. PATIENT STATES NO PAIN 
AT THIS TIME. PATIENT HAS IV ACCESS ON LEFT FOOT #20 G. WOUND VAC IN PLACE DRAINING PHILIP 
FLUID. SAFETY MEASURES ARE IN PLACE, BED IS LOCKED AND PLACED IN THE LOW POSITION, SIDE 
RAILS UP X 2. CALL LIGHT IS WITHIN REACH. WILL CONTINUE TO MONITOR THROUGH OUT SHIFT.

## 2020-10-24 VITALS — SYSTOLIC BLOOD PRESSURE: 158 MMHG | DIASTOLIC BLOOD PRESSURE: 81 MMHG

## 2020-10-24 VITALS — SYSTOLIC BLOOD PRESSURE: 150 MMHG | DIASTOLIC BLOOD PRESSURE: 86 MMHG

## 2020-10-24 VITALS — DIASTOLIC BLOOD PRESSURE: 78 MMHG | SYSTOLIC BLOOD PRESSURE: 136 MMHG

## 2020-10-24 VITALS — DIASTOLIC BLOOD PRESSURE: 81 MMHG | SYSTOLIC BLOOD PRESSURE: 148 MMHG

## 2020-10-24 LAB
BASOPHILS # BLD AUTO: 0 /CMM (ref 0–0.2)
BASOPHILS NFR BLD AUTO: 0.6 % (ref 0–2)
BUN SERPL-MCNC: 14 MG/DL (ref 7–18)
CALCIUM SERPL-MCNC: 9.1 MG/DL (ref 8.5–10.1)
CHLORIDE SERPL-SCNC: 103 MMOL/L (ref 98–107)
CO2 SERPL-SCNC: 28 MMOL/L (ref 21–32)
CREAT SERPL-MCNC: 0.7 MG/DL (ref 0.6–1.3)
EOSINOPHIL NFR BLD AUTO: 3.2 % (ref 0–6)
GLUCOSE SERPL-MCNC: 92 MG/DL (ref 74–106)
HCT VFR BLD AUTO: 32 % (ref 39–51)
HGB BLD-MCNC: 10.8 G/DL (ref 13.5–17.5)
LYMPHOCYTES NFR BLD AUTO: 2.4 /CMM (ref 0.8–4.8)
LYMPHOCYTES NFR BLD AUTO: 33.4 % (ref 20–44)
MCHC RBC AUTO-ENTMCNC: 34 G/DL (ref 31–36)
MCV RBC AUTO: 85 FL (ref 80–96)
MONOCYTES NFR BLD AUTO: 0.5 /CMM (ref 0.1–1.3)
MONOCYTES NFR BLD AUTO: 7.1 % (ref 2–12)
NEUTROPHILS # BLD AUTO: 4 /CMM (ref 1.8–8.9)
NEUTROPHILS NFR BLD AUTO: 55.7 % (ref 43–81)
PLATELET # BLD AUTO: 690 /CMM (ref 150–450)
POTASSIUM SERPL-SCNC: 4 MMOL/L (ref 3.5–5.1)
RBC # BLD AUTO: 3.72 MIL/UL (ref 4.5–6)
SODIUM SERPL-SCNC: 140 MMOL/L (ref 136–145)
WBC NRBC COR # BLD AUTO: 7.1 K/UL (ref 4.3–11)

## 2020-10-24 RX ADMIN — OXYCODONE HYDROCHLORIDE AND ACETAMINOPHEN PRN UDTAB: 5; 325 TABLET ORAL at 09:09

## 2020-10-24 RX ADMIN — ENOXAPARIN SODIUM SCH MG: 40 INJECTION SUBCUTANEOUS at 09:08

## 2020-10-24 RX ADMIN — PANTOPRAZOLE SODIUM SCH MG: 40 TABLET, DELAYED RELEASE ORAL at 09:08

## 2020-10-24 RX ADMIN — HYDROCODONE BITARTRATE AND ACETAMINOPHEN PRN TAB: 10; 325 TABLET ORAL at 23:52

## 2020-10-24 NOTE — NUR
PT STILL TOTALLY NUDE IN THE ROOM AND EXPLAINED THAT HE NEEDS TO PUT HIS HOSPITAL GOWN ON OR 
ATLEAST WEAR UNDERWEAR, PT REFUSED IN SPITE OF OFFERING HOSPITAL GOWN AND DISPOSABLE 
UNDERWEAR ,PT STATED THAT  HE IS ALL BY HIMSELF IN THE ROOM ANYWAY. EXPLAINED THE HOSPITAL 
POLICY AGAINST NUDITY AND EXPOSURE OF PRIVATE PARTS BUT PT INSISTS TO REFUSE.

## 2020-10-24 NOTE — NUR
RECEIVED PT ON BED AWAKE A/O X4 WITH IV ON LEFT FOOT PATENT AND FLUSHED, PT REFUSED IV FLUID 
AND SAYS HE IS ALREADY EATING AND HE DRINK WELL,RISK AND BENEFITS EXPLAINED  RIGHT FOOT 
WOUND VAC ON PLACE DRESSING WAS DRY AND CLEAN WITH OUTPUT OF SEROSANGUINOUS FLUID, SAFETY 
MEASURE MAINTAIN BED ON LOWEST POSITION AND LOCKED SIDE RAILS UP CALL LIGHT WITHIN REACH 
WILL CONT TO MONITOR

## 2020-10-24 NOTE — NUR
Closing Notes: Patient in alert, awake, and oriented 4. Patient in bed, no s/s of distress 
or discomfort, no c/o pain, no s/s of distress or discomfort noted. Noted with 30ml output 
on surgical drainage. Patient still refusing IV fluids since the start of shift, explained 
risks and benefits, patient still refusing. Patient insisted that he's been drinking enough 
fluids and eating well. Patient assisted with ADLs, linens changed. kept clean and dry. Fall 
precautions observed. Reminded patient to use call light. Call light within easy reach. VS 
WNL.

## 2020-10-24 NOTE — NUR
Patient refusing IV fluids, explained risks and benefits, offered x3, patient still 
refusing. Patient noted with good food and fluid intake. Assisted with meals and ADLs. Will 
continue to monitor patient. Call light within reach. VS WNL.

## 2020-10-24 NOTE — NUR
MS/RN CLOSING NOTES 



PATIENT IN BED RESTING. PATIENT IS ALERT AND ORIENTED X 4. NO SIGNS OF SOB OR RESPIRATORY 
DISTRESS NOTED. PATIENTS BREATHING IS EVEN AND UNLABORED. PATIENT STATES NO PAIN AT THIS 
TIME. PATIENT HAS IV ACCESS ON LEFT FOOT #20 G INTACT, PATIENT REFUSED TO HAVE FLUIDS 
RUNNING. WOUND VAC IN PLACE DRAINING PHILIP FLUID, OUTPUT 50CC DURING SHIFT, VAC LEVEL AT 
425. ALL PATIENT NEEDS HAVE BEEN MET DURING SHIFT. SAFETY MEASURES ARE IN PLACE, BED IS 
LOCKED AND PLACED IN THE LOW POSITION, SIDE RAILS UP X 2. CALL LIGHT IS WITHIN REACH. WILL 
ENDORSE CARE TO DAY SHIFT.

## 2020-10-24 NOTE — NUR
Report received from night RN. Patient alert, awake and oriented x4. Patient able to make 
needs known. No s/s of distress or discomfort, no SOB noted. 0905 Patient with c/o pain on 
his upper extremities, pain scale 10/10. PRN pain medication given, tolerated well. Will 
continue to monitor patient. 1009 Reassessed patient, pain scale 1/10, no facial grimacing 
or moaning noted. No s/s of distress or discomfort. Assisted with ADLs and transfers. Fall 
precautions observed. Call light within reach. Will continue to monitor patient.

## 2020-10-24 NOTE — NUR
SEEN PT TOTALLY NUDE IN THE ROOM AND EXPLAINED THAT HE NEEDS TO PUT HIS HOSPITAL GOWN ON OR 
ATLEAST WEAR UNDERWEAR, PT REFUSED IN SPITE OF OFFERING HOSPITAL GOWN AND DISPOSABLE 
UNDERWEAR ,PT STATED THAT  HE IS ALL BY HIMSELF IN THE ROOM ANYWAY. EXPLAINED THE HOSPITAL 
POLICY AGAINST NUDITY AND EXPOSURE OF PRIVATE PARTS BUT PT INSISTS TO REFUSE. WILL TRY 
LATER.

## 2020-10-25 VITALS — SYSTOLIC BLOOD PRESSURE: 154 MMHG | DIASTOLIC BLOOD PRESSURE: 98 MMHG

## 2020-10-25 VITALS — SYSTOLIC BLOOD PRESSURE: 147 MMHG | DIASTOLIC BLOOD PRESSURE: 74 MMHG

## 2020-10-25 VITALS — SYSTOLIC BLOOD PRESSURE: 131 MMHG | DIASTOLIC BLOOD PRESSURE: 74 MMHG

## 2020-10-25 LAB
BASOPHILS # BLD AUTO: 0 /CMM (ref 0–0.2)
BASOPHILS NFR BLD AUTO: 0.4 % (ref 0–2)
BUN SERPL-MCNC: 15 MG/DL (ref 7–18)
CALCIUM SERPL-MCNC: 9.3 MG/DL (ref 8.5–10.1)
CHLORIDE SERPL-SCNC: 102 MMOL/L (ref 98–107)
CO2 SERPL-SCNC: 29 MMOL/L (ref 21–32)
CREAT SERPL-MCNC: 0.7 MG/DL (ref 0.6–1.3)
EOSINOPHIL NFR BLD AUTO: 2.3 % (ref 0–6)
GLUCOSE SERPL-MCNC: 98 MG/DL (ref 74–106)
HCT VFR BLD AUTO: 34 % (ref 39–51)
HGB BLD-MCNC: 11.4 G/DL (ref 13.5–17.5)
LYMPHOCYTES NFR BLD AUTO: 1.9 /CMM (ref 0.8–4.8)
LYMPHOCYTES NFR BLD AUTO: 29.7 % (ref 20–44)
MCHC RBC AUTO-ENTMCNC: 34 G/DL (ref 31–36)
MCV RBC AUTO: 85 FL (ref 80–96)
MONOCYTES NFR BLD AUTO: 0.4 /CMM (ref 0.1–1.3)
MONOCYTES NFR BLD AUTO: 6.7 % (ref 2–12)
NEUTROPHILS # BLD AUTO: 4 /CMM (ref 1.8–8.9)
NEUTROPHILS NFR BLD AUTO: 60.9 % (ref 43–81)
PLATELET # BLD AUTO: 704 /CMM (ref 150–450)
POTASSIUM SERPL-SCNC: 4.1 MMOL/L (ref 3.5–5.1)
RBC # BLD AUTO: 3.95 MIL/UL (ref 4.5–6)
SODIUM SERPL-SCNC: 139 MMOL/L (ref 136–145)
WBC NRBC COR # BLD AUTO: 6.5 K/UL (ref 4.3–11)

## 2020-10-25 RX ADMIN — HYDROCODONE BITARTRATE AND ACETAMINOPHEN PRN TAB: 10; 325 TABLET ORAL at 09:33

## 2020-10-25 RX ADMIN — PANTOPRAZOLE SODIUM SCH MG: 40 TABLET, DELAYED RELEASE ORAL at 09:04

## 2020-10-25 RX ADMIN — ENOXAPARIN SODIUM SCH MG: 40 INJECTION SUBCUTANEOUS at 09:02

## 2020-10-25 NOTE — NUR
MS RN CLOSING NOTES



PT AWAKE IN BED AT THIS.  PT REMAINED STABLE THROUGHOUT SHIFT. PT KEPT CLEAN AND DRY. ALL 
CARE, NEEDS, MEDICATIONS AND TREATMENT ADMINISTERED AS ANTICIPATED PER ORDER. PAIN 
MANAGEMENT ADMINISTERED PER ORDER. SAFETY PRECAUTION IN PLACE AND MAINTAINED AT ALL TIMES. 
BED IN LOWEST LOCKED POSITION, HOB ELEVATED, SIDE RAILS UP X 2, CALL LIGHT WITHIN REACH. 
WILL ENDORSE TO NIGHT SHIFT NURSE FOR D[facility]

## 2020-10-25 NOTE — NUR
PT ON BED RESTING NO SIGN AND SYMPTOMS OF RESPIRATORY DISTRESS MINIMAL PAIN ON SURGERY SITE 
COMPLAINTS BUT TOLERABLE AT THIS MOMENT,WOUND VAC ON RIGHT FORE ARM SURGERY SITE STILL ON 
PLACE WITH 25ML SEROSANGUINEOUS OUTPUT NOTED, NO SIGNIFICANT CHANGES ON CONDITION NOTED ALL 
NEEDS ATTENDED SAFETY MEASURE MAINTAINED BED ON LOWEST POSITION AND LOCKED WILL ENDORSE TO 
AM SHIFT NURSE

## 2020-10-25 NOTE — NUR
PT C/O ACHING LEFT/RIGHT ARM PAIN OF 6/10. PT NOTED WITH FACIAL GRIMACE. VS WNL. PER PT 
REQUEST, NORCO 10-325MG WAS ADMINISTERED AT THIS TIME PER ORDER. WILL CONTINUE TO MONITOR

## 2020-10-25 NOTE — NUR
MS RN OPENING NOTES



RECEIVED PT AWAKE IN BED AT THIS TIME. AOX4. PT ABLE TO VERBALIZE NEEDS. NO SOB NOTED, NO 
S/S OF ANY ACUTE DISTRESS NOTED.  NO C/O PAIN AT THIS TIME. RESPIRATIONS ARE EVEN AND 
UNLABORED WITH EQUAL RISE AND FALL IN CHEST. IV ACCESS NOTED IN LEFT FOOT G#20, INTACT, 
PATENT AND FLUSHING WELL. RFA WOUND VAC NOTED WITH NO DRAINAGE AT THIS TIME. SAFETY 
PRECAUTION IN PLACE AND MAINTAINED AT ALL TIMES. BED IN LOWEST LOCKED POSITION, HOB 
ELEVATED, SIDE RAILS UP X 2, CALL LIGHT WITHIN REACH. WILL CONTINUE TO MONITOR

## 2020-10-25 NOTE — NUR
ms rn opening note 



recived patient in bed. a/ox4. tolerating room air. respiratins are even and unlabored. no 
s/s sob noted. states he is in pain but it is tolerable and will ask for medication later. 
in no apparent distress. iv access in left foot#20 patent and slaine locked, patient in 
refusing to be connected to ivf, good po intake. wound vac still maintained in right 
forearm. bed is low and locked, hob elevated in semi nielsen, side rails up x2. taz light 
within reach. will continue to monitor. 

-------------------------------------------------------------------------------

Addendum: 10/26/20 at 0659 by SCOOBY PRATT RN

-------------------------------------------------------------------------------

note entered at wrong time. correct time: 10/25/20 1930

## 2020-10-25 NOTE — NUR
ms rn opening note 



recived patient in bed. a/ox4. tolerating room air. respiratins are even and unlabored. no 
s/s sob noted. states he is in pain but it is tolerable and will ask for medication later. 
in no apparent distress. iv access in left foot#20 patent and slaine locked, patient in 
refusing to be connected to ivf, good po intake. wound vac still maintained in right 
forearm. bed is low and locked, hob elevated in semi nielsen, side rails up x2. taz light 
within reach. will continue to monitor.

## 2020-10-26 VITALS — SYSTOLIC BLOOD PRESSURE: 147 MMHG | DIASTOLIC BLOOD PRESSURE: 96 MMHG

## 2020-10-26 LAB
BASOPHILS # BLD AUTO: 0 /CMM (ref 0–0.2)
BASOPHILS NFR BLD AUTO: 0.4 % (ref 0–2)
BUN SERPL-MCNC: 15 MG/DL (ref 7–18)
CALCIUM SERPL-MCNC: 9.3 MG/DL (ref 8.5–10.1)
CHLORIDE SERPL-SCNC: 102 MMOL/L (ref 98–107)
CO2 SERPL-SCNC: 29 MMOL/L (ref 21–32)
CREAT SERPL-MCNC: 0.8 MG/DL (ref 0.6–1.3)
EOSINOPHIL NFR BLD AUTO: 2.6 % (ref 0–6)
GLUCOSE SERPL-MCNC: 90 MG/DL (ref 74–106)
HCT VFR BLD AUTO: 33 % (ref 39–51)
HGB BLD-MCNC: 11.5 G/DL (ref 13.5–17.5)
LYMPHOCYTES NFR BLD AUTO: 1.9 /CMM (ref 0.8–4.8)
LYMPHOCYTES NFR BLD AUTO: 27.4 % (ref 20–44)
MCHC RBC AUTO-ENTMCNC: 34 G/DL (ref 31–36)
MCV RBC AUTO: 85 FL (ref 80–96)
MONOCYTES NFR BLD AUTO: 0.4 /CMM (ref 0.1–1.3)
MONOCYTES NFR BLD AUTO: 5.8 % (ref 2–12)
NEUTROPHILS # BLD AUTO: 4.3 /CMM (ref 1.8–8.9)
NEUTROPHILS NFR BLD AUTO: 63.8 % (ref 43–81)
PLATELET # BLD AUTO: 692 /CMM (ref 150–450)
POTASSIUM SERPL-SCNC: 4.1 MMOL/L (ref 3.5–5.1)
RBC # BLD AUTO: 3.93 MIL/UL (ref 4.5–6)
SODIUM SERPL-SCNC: 139 MMOL/L (ref 136–145)
WBC NRBC COR # BLD AUTO: 6.8 K/UL (ref 4.3–11)

## 2020-10-26 RX ADMIN — PANTOPRAZOLE SODIUM SCH MG: 40 TABLET, DELAYED RELEASE ORAL at 08:39

## 2020-10-26 RX ADMIN — HYDROCODONE BITARTRATE AND ACETAMINOPHEN PRN TAB: 10; 325 TABLET ORAL at 15:08

## 2020-10-26 RX ADMIN — HYDROCODONE BITARTRATE AND ACETAMINOPHEN PRN TAB: 10; 325 TABLET ORAL at 08:40

## 2020-10-26 RX ADMIN — HYDROCODONE BITARTRATE AND ACETAMINOPHEN PRN TAB: 10; 325 TABLET ORAL at 00:02

## 2020-10-26 RX ADMIN — ENOXAPARIN SODIUM SCH MG: 40 INJECTION SUBCUTANEOUS at 08:44

## 2020-10-26 NOTE — NUR
WOUND CARE: KCI VAC FUNCTIONING WELL AT 125mmHg CONTINUOUS SETTING TO RT FOREARM WOUND. 
APPROXIMATELY 30cc PINK DRAINAGE IN CANISTER. HOME VAC UNIT TO BE ORDERED.

## 2020-10-26 NOTE — NUR
ms rn note 



administered prn norco 10/325mg for pain 7/10 in both arms. will continue to monitor.

## 2020-10-26 NOTE — NUR
ms rn closing note 



patient is resting in bed. a/ox4. tolerating room air. no resp distress noted. managed papin 
with norco 10. no distress. iv access maintained in left foot#20 patent and slaine locked. 
wound vac still maintained in right forearm with no sufficient drainage. bed remains low and 
locked, hob elevated in semi nielsen, side rails up x2. taz light within reach. will endorse 
to next shift

## 2020-10-26 NOTE — NUR
WOUND CARE RN   WOUND CARE RN FAXED ALL PAPERWORK TO [facility] FOR THE HOME VAC UNIT AND SUPPLY 
DELIVERY WITH CONFIRMATION OF RECEIPT OF ALL PAPERWORK BY GERRY.  IF THERE ARE ANY QUESTIONS 
PLEASE CALL [facility] [phone].  CASE MANAGEMENT IS ARRANGING HOME HEALTH FOR PATIENT.  
PATIENT TO FOLLOW UP WITH DR GENAO AT [facility] AT 
[phone].  PATIENT WILL CALL AND MAKE AN APPT FOR THIS THURSDAY 10/29/20.

## 2020-10-26 NOTE — NUR
WOUND CARE: KCI VAC DISCONTINUED FOR PT DISCHARGE. DRESSING REMOVED, RT FOREARM CLEANSED 
WITH NS, XEROFORM DSG APPLIED, COVERED WITH GAUZE, ABD PAD, SECURED WITH KERLIX, SPLINT IN 
PLACE, SECURED GENTLY WITH ACE WRAP. PT TOLERATED WELL. WOUND HAS RED GRANULATION TISSUE AND 
MEASURES 12CM X 9CM X 0.1CM. THERE IS SOME SWELLING NEAR ELBOW AREA. CANISTER OF WOUND VAC 
HAD ONLY 30cc PINK DRAINAGE. PT TO FOLLOW UP WITH SURGEON AFTER DISCHARGE. PT HAS PHONE 
NUMBER OF [facility] ([facility]).

## 2020-10-26 NOTE — NUR
MS RN OPENING NOTES



RECEIVED PT AWAKE IN BED. AOX4. NO SOB OR ANY ACUTE RESPIRATORY DISTRESS NOTED.  NO PAIN 
REPORTED AT THIS TIME. IV SITE NOTED AT LEFT FOOT G#20, INTACT, PATENT AND FLUSHED. RFA 
WOUND VAC NOTED WITH LESS THAN 100ML DRAINAGE. SAFETY MEASURES OBSERVED. CALL LIGHT WITHIN 
REACH. BED LOCKED AND AT LOWEST POSITION WITH SIDE RAILS UP X 2. HOB ELEVATED. WILL CONTINUE 
TO MONITOR

## 2020-10-29 ENCOUNTER — HOSPITAL ENCOUNTER (OUTPATIENT)
Dept: HOSPITAL 54 - WOU | Age: 30
Discharge: HOME | End: 2020-10-29
Attending: SURGERY
Payer: COMMERCIAL

## 2020-10-29 DIAGNOSIS — M79.631: ICD-10-CM
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
## 2020-10-29 DIAGNOSIS — S41.111D: ICD-10-CM
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
## 2020-10-29 DIAGNOSIS — S51.811A: Primary | ICD-10-CM
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
## 2020-11-02 ENCOUNTER — HOSPITAL ENCOUNTER (OUTPATIENT)
Dept: HOSPITAL 54 - WOU | Age: 30
Discharge: HOME | End: 2020-11-02
Attending: SURGERY
Payer: COMMERCIAL

## 2020-11-02 DIAGNOSIS — M79.601: ICD-10-CM
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
## 2020-11-02 DIAGNOSIS — S41.111D: ICD-10-CM

## 2020-11-02 DIAGNOSIS — S51.811A: Primary | ICD-10-CM
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
## 2020-11-02 PROCEDURE — 11043 DBRDMT MUSC&/FSCA 1ST 20/<: CPT

## 2020-11-02 PROCEDURE — 11046 DBRDMT MUSC&/FSCA EA ADDL: CPT

## 2020-11-02 PROCEDURE — 97606 NEG PRS WND THER DME>50 SQCM: CPT

## 2020-11-02 PROCEDURE — A6253 ABSORPT DRG > 48 SQ IN W/O B: HCPCS

## 2020-11-05 ENCOUNTER — HOSPITAL ENCOUNTER (OUTPATIENT)
Dept: HOSPITAL 54 - WOU | Age: 30
Discharge: HOME | End: 2020-11-05
Attending: SURGERY
Payer: COMMERCIAL

## 2020-11-05 DIAGNOSIS — S41.111D: ICD-10-CM

## 2020-11-05 DIAGNOSIS — M79.631: ICD-10-CM
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
## 2020-11-05 DIAGNOSIS — S51.811A: Primary | ICD-10-CM
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
## 2020-11-06 ENCOUNTER — HOSPITAL ENCOUNTER (OUTPATIENT)
Dept: HOSPITAL 54 - LAB | Age: 30
Discharge: HOME | End: 2020-11-06
Attending: SPECIALIST
Payer: COMMERCIAL

## 2020-11-06 DIAGNOSIS — Z20.828: ICD-10-CM

## 2020-11-06 DIAGNOSIS — Z01.812: Primary | ICD-10-CM

## 2020-11-06 PROCEDURE — U0003 INFECTIOUS AGENT DETECTION BY NUCLEIC ACID (DNA OR RNA); SEVERE ACUTE RESPIRATORY SYNDROME CORONAVIRUS 2 (SARS-COV-2) (CORONAVIRUS DISEASE [COVID-19]), AMPLIFIED PROBE TECHNIQUE, MAKING USE OF HIGH THROUGHPUT TECHNOLOGIES AS DESCRIBED BY CMS-2020-01-R: HCPCS

## 2020-11-06 PROCEDURE — 87426 SARSCOV CORONAVIRUS AG IA: CPT

## 2020-11-06 PROCEDURE — C9803 HOPD COVID-19 SPEC COLLECT: HCPCS

## 2020-11-09 ENCOUNTER — HOSPITAL ENCOUNTER (OUTPATIENT)
Dept: HOSPITAL 54 - WOU | Age: 30
Discharge: HOME | End: 2020-11-09
Attending: SURGERY
Payer: COMMERCIAL
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
## 2020-11-09 DIAGNOSIS — S51.811A: Primary | ICD-10-CM

## 2020-11-09 DIAGNOSIS — M79.631: ICD-10-CM
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
## 2020-11-09 DIAGNOSIS — S41.111D: ICD-10-CM
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
## 2020-11-10 ENCOUNTER — HOSPITAL ENCOUNTER (OUTPATIENT)
Dept: HOSPITAL 54 - DS | Age: 30
Discharge: HOME | End: 2020-11-10
Attending: SPECIALIST
Payer: COMMERCIAL
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
## 2020-11-10 DIAGNOSIS — T84.84XA: Primary | ICD-10-CM

## 2020-11-10 PROCEDURE — 73070 X-RAY EXAM OF ELBOW: CPT

## 2020-11-10 PROCEDURE — 20610 DRAIN/INJ JOINT/BURSA W/O US: CPT

## 2020-11-10 PROCEDURE — 73100 X-RAY EXAM OF WRIST: CPT

## 2020-11-10 PROCEDURE — 24300 MNPJ ELBOW UNDER ANES: CPT

## 2020-11-10 PROCEDURE — 26320 REMOVAL OF IMPLANT FROM HAND: CPT

## 2020-11-12 ENCOUNTER — HOSPITAL ENCOUNTER (OUTPATIENT)
Dept: HOSPITAL 54 - WOU | Age: 30
Discharge: HOME | End: 2020-11-12
Attending: SURGERY
Payer: COMMERCIAL

## 2020-11-12 DIAGNOSIS — S41.111D: ICD-10-CM
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
## 2020-11-12 DIAGNOSIS — M79.631: ICD-10-CM
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
## 2020-11-12 DIAGNOSIS — S51.811A: Primary | ICD-10-CM

## 2020-11-16 ENCOUNTER — HOSPITAL ENCOUNTER (OUTPATIENT)
Dept: HOSPITAL 54 - WOU | Age: 30
Discharge: HOME | End: 2020-11-16
Attending: SURGERY
Payer: COMMERCIAL
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
## 2020-11-16 DIAGNOSIS — S51.811A: Primary | ICD-10-CM

## 2020-11-16 DIAGNOSIS — S41.111D: ICD-10-CM

## 2020-11-16 DIAGNOSIS — M79.631: ICD-10-CM
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
## 2020-11-19 ENCOUNTER — HOSPITAL ENCOUNTER (OUTPATIENT)
Dept: HOSPITAL 54 - WOU | Age: 30
Discharge: HOME | End: 2020-11-19
Attending: SURGERY
Payer: COMMERCIAL
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
## 2020-11-19 DIAGNOSIS — M79.631: ICD-10-CM

## 2020-11-19 DIAGNOSIS — S51.811A: Primary | ICD-10-CM

## 2020-11-19 DIAGNOSIS — S41.111D: ICD-10-CM
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
## 2020-11-23 ENCOUNTER — HOSPITAL ENCOUNTER (OUTPATIENT)
Dept: HOSPITAL 54 - WOU | Age: 30
Discharge: HOME | End: 2020-11-23
Attending: SURGERY
Payer: COMMERCIAL
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
## 2020-11-23 DIAGNOSIS — S51.811A: Primary | ICD-10-CM

## 2020-11-23 DIAGNOSIS — M79.631: ICD-10-CM
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
## 2020-11-23 DIAGNOSIS — S41.111D: ICD-10-CM

## 2020-12-03 ENCOUNTER — HOSPITAL ENCOUNTER (OUTPATIENT)
Dept: HOSPITAL 54 - WOU | Age: 30
Discharge: HOME | End: 2020-12-03
Attending: SURGERY
Payer: COMMERCIAL

## 2020-12-03 DIAGNOSIS — S51.811A: Primary | ICD-10-CM

## 2020-12-03 DIAGNOSIS — M79.631: ICD-10-CM
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
## 2020-12-03 DIAGNOSIS — Z20.828: ICD-10-CM
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
## 2020-12-03 LAB
ALBUMIN SERPL BCP-MCNC: 4.2 G/DL (ref 3.4–5)
ALP SERPL-CCNC: 101 U/L (ref 46–116)
ALT SERPL W P-5'-P-CCNC: 149 U/L (ref 12–78)
AST SERPL W P-5'-P-CCNC: 47 U/L (ref 15–37)
BASOPHILS # BLD AUTO: 0 /CMM (ref 0–0.2)
BASOPHILS NFR BLD AUTO: 0.4 % (ref 0–2)
BILIRUB SERPL-MCNC: 0.2 MG/DL (ref 0.2–1)
BUN SERPL-MCNC: 12 MG/DL (ref 7–18)
CALCIUM SERPL-MCNC: 9.3 MG/DL (ref 8.5–10.1)
CHLORIDE SERPL-SCNC: 102 MMOL/L (ref 98–107)
CO2 SERPL-SCNC: 28 MMOL/L (ref 21–32)
CREAT SERPL-MCNC: 0.7 MG/DL (ref 0.6–1.3)
EOSINOPHIL NFR BLD AUTO: 2.1 % (ref 0–6)
GLUCOSE SERPL-MCNC: 95 MG/DL (ref 74–106)
HCT VFR BLD AUTO: 39 % (ref 39–51)
HGB BLD-MCNC: 12.8 G/DL (ref 13.5–17.5)
LYMPHOCYTES NFR BLD AUTO: 2.2 /CMM (ref 0.8–4.8)
LYMPHOCYTES NFR BLD AUTO: 33.7 % (ref 20–44)
MCHC RBC AUTO-ENTMCNC: 33 G/DL (ref 31–36)
MCV RBC AUTO: 84 FL (ref 80–96)
MONOCYTES NFR BLD AUTO: 0.4 /CMM (ref 0.1–1.3)
MONOCYTES NFR BLD AUTO: 5.7 % (ref 2–12)
NEUTROPHILS # BLD AUTO: 3.8 /CMM (ref 1.8–8.9)
NEUTROPHILS NFR BLD AUTO: 58.1 % (ref 43–81)
PLATELET # BLD AUTO: 356 /CMM (ref 150–450)
POTASSIUM SERPL-SCNC: 3.8 MMOL/L (ref 3.5–5.1)
PROT SERPL-MCNC: 7.8 G/DL (ref 6.4–8.2)
RBC # BLD AUTO: 4.58 MIL/UL (ref 4.5–6)
SODIUM SERPL-SCNC: 140 MMOL/L (ref 136–145)
WBC NRBC COR # BLD AUTO: 6.5 K/UL (ref 4.3–11)

## 2020-12-03 PROCEDURE — 85730 THROMBOPLASTIN TIME PARTIAL: CPT

## 2020-12-03 PROCEDURE — 11042 DBRDMT SUBQ TIS 1ST 20SQCM/<: CPT

## 2020-12-03 PROCEDURE — 85025 COMPLETE CBC W/AUTO DIFF WBC: CPT

## 2020-12-03 PROCEDURE — 85610 PROTHROMBIN TIME: CPT

## 2020-12-03 PROCEDURE — 97605 NEG PRS WND THER DME<=50SQCM: CPT

## 2020-12-03 PROCEDURE — C9803 HOPD COVID-19 SPEC COLLECT: HCPCS

## 2020-12-03 PROCEDURE — 80053 COMPREHEN METABOLIC PANEL: CPT

## 2020-12-03 PROCEDURE — 36415 COLL VENOUS BLD VENIPUNCTURE: CPT

## 2020-12-03 PROCEDURE — U0003 INFECTIOUS AGENT DETECTION BY NUCLEIC ACID (DNA OR RNA); SEVERE ACUTE RESPIRATORY SYNDROME CORONAVIRUS 2 (SARS-COV-2) (CORONAVIRUS DISEASE [COVID-19]), AMPLIFIED PROBE TECHNIQUE, MAKING USE OF HIGH THROUGHPUT TECHNOLOGIES AS DESCRIBED BY CMS-2020-01-R: HCPCS

## 2020-12-03 PROCEDURE — 11045 DBRDMT SUBQ TISS EACH ADDL: CPT

## 2020-12-07 ENCOUNTER — HOSPITAL ENCOUNTER (OUTPATIENT)
Dept: HOSPITAL 54 - WOU | Age: 30
Discharge: HOME | End: 2020-12-07
Attending: SURGERY
Payer: COMMERCIAL
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
## 2020-12-07 DIAGNOSIS — M79.631: ICD-10-CM

## 2020-12-07 DIAGNOSIS — S51.811A: Primary | ICD-10-CM

## 2020-12-10 ENCOUNTER — HOSPITAL ENCOUNTER (OUTPATIENT)
Dept: HOSPITAL 54 - DS | Age: 30
Discharge: HOME | End: 2020-12-10
Attending: SURGERY
Payer: COMMERCIAL

## 2020-12-10 DIAGNOSIS — Z79.899: ICD-10-CM

## 2020-12-10 DIAGNOSIS — M79.631: ICD-10-CM

## 2020-12-10 DIAGNOSIS — T81.89XA: Primary | ICD-10-CM

## 2020-12-10 DIAGNOSIS — I10: ICD-10-CM

## 2020-12-10 PROCEDURE — A6209 FOAM DRSG <=16 SQ IN W/O BDR: HCPCS

## 2020-12-10 PROCEDURE — 15100 SPLT AGRFT T/A/L 1ST 100SQCM: CPT

## 2020-12-10 PROCEDURE — A6253 ABSORPT DRG > 48 SQ IN W/O B: HCPCS

## 2020-12-10 PROCEDURE — 97605 NEG PRS WND THER DME<=50SQCM: CPT

## 2020-12-10 PROCEDURE — 15002 WOUND PREP TRK/ARM/LEG: CPT

## 2020-12-21 ENCOUNTER — HOSPITAL ENCOUNTER (OUTPATIENT)
Dept: HOSPITAL 54 - WOU | Age: 30
Discharge: HOME | End: 2020-12-21
Attending: SURGERY
Payer: COMMERCIAL

## 2020-12-21 DIAGNOSIS — M79.631: ICD-10-CM

## 2020-12-21 DIAGNOSIS — T81.89XA: ICD-10-CM

## 2020-12-21 DIAGNOSIS — Z94.5: ICD-10-CM

## 2020-12-21 DIAGNOSIS — Z48.817: Primary | ICD-10-CM

## 2020-12-21 PROCEDURE — G0463 HOSPITAL OUTPT CLINIC VISIT: HCPCS

## 2021-07-05 NOTE — NUR
PATIENT PREPARED FOR MODERATE SEDATION, VERBAL CONSENT PROVIDED BY PATIENT, 
HE'S UNABLE TO MOVE HIS HANDS. WITNESSED BY 2 RN'S. Surgical follow up only.  No TCM call per procedures. Anna Collins RN on 7/5/2021 at 9:10 AM

## 2024-10-05 NOTE — NUR
RN CLOSING NOTES



DISCHARGED PT TO HOME ACCOMPANIED BY FRIEND. PT IN STABLE CONDITION, VS ARE WNL. CASE 
MANAGER CALLED TO ASK PT TO WAIT WHILE THEY VERIFY HOME HEALTH. PT REFUSED TO WAIT AND 
INSISTED ON GOING HOME. PT FORGOT DISCHARGE FOLDER AND PRESCRIPTION AT BEDSIDE TABLE. CALLED 
SUHAS FRIEND AND SUHAS CALLED THE PT BUT DID NOT GET AN ANSWER. SUHAS PROVIDED PT NUMBER. 
CALLED THE NUMBER AND PT STATES THAT HE FORGOT AND WILL TRY TO COME BACK ON THURSDAY TO PICK 
IT UP. CHARGE NURSE IS AWARE, LEFT THE FOLDER AND PRESCRIPTION AT NURSES STATION. <<--- Click to launch